# Patient Record
Sex: FEMALE | Race: WHITE | NOT HISPANIC OR LATINO | URBAN - METROPOLITAN AREA
[De-identification: names, ages, dates, MRNs, and addresses within clinical notes are randomized per-mention and may not be internally consistent; named-entity substitution may affect disease eponyms.]

---

## 2018-02-12 ENCOUNTER — EMERGENCY (EMERGENCY)
Facility: HOSPITAL | Age: 38
LOS: 1 days | Discharge: HOME | End: 2018-02-12
Attending: EMERGENCY MEDICINE
Payer: COMMERCIAL

## 2018-02-12 VITALS
OXYGEN SATURATION: 97 % | SYSTOLIC BLOOD PRESSURE: 113 MMHG | TEMPERATURE: 98 F | HEART RATE: 68 BPM | DIASTOLIC BLOOD PRESSURE: 79 MMHG | RESPIRATION RATE: 14 BRPM

## 2018-02-12 VITALS
TEMPERATURE: 98 F | HEART RATE: 90 BPM | SYSTOLIC BLOOD PRESSURE: 134 MMHG | RESPIRATION RATE: 12 BRPM | OXYGEN SATURATION: 98 % | WEIGHT: 250 LBS | DIASTOLIC BLOOD PRESSURE: 92 MMHG

## 2018-02-12 PROBLEM — Z00.00 ENCOUNTER FOR PREVENTIVE HEALTH EXAMINATION: Status: ACTIVE | Noted: 2018-02-12

## 2018-02-12 PROCEDURE — 70450 CT HEAD/BRAIN W/O DYE: CPT | Mod: 26

## 2018-02-12 PROCEDURE — 70450 CT HEAD/BRAIN W/O DYE: CPT

## 2018-02-12 PROCEDURE — 72125 CT NECK SPINE W/O DYE: CPT

## 2018-02-12 PROCEDURE — 99284 EMERGENCY DEPT VISIT MOD MDM: CPT | Mod: 25

## 2018-02-12 PROCEDURE — 99284 EMERGENCY DEPT VISIT MOD MDM: CPT

## 2018-02-12 PROCEDURE — 72125 CT NECK SPINE W/O DYE: CPT | Mod: 26

## 2018-02-12 NOTE — ED ADULT TRIAGE NOTE - CHIEF COMPLAINT QUOTE
patient with complain of dizziness s/p unwitnessed fall on 2/9/2018 sent to ED by PMD, + head trauma, denies LOC, nausea and vomiting, vision changes

## 2018-02-12 NOTE — ED ADULT NURSE NOTE - NSHISCREENINGQ1_ED_A_ED
Verified Results  (1) PT WITH INR [id number] 08:18AM Kofi Cutler     Test Name Result Flag Reference   INR 2 24 H 0 86-1 16   PT 24 5 seconds H 12 0-14 3
No

## 2018-02-12 NOTE — ED PROVIDER NOTE - CHPI ED SYMPTOMS NEG
no nausea/no syncope/no blurred vision/no loss of consciousness/no vomiting/no weakness/no confusion/no change in level of consciousness

## 2018-02-12 NOTE — ED ADULT NURSE NOTE - OBJECTIVE STATEMENT
Pt. fell on Friday and hit her head on part of her friends bathtub. Pt. denies LOC, nausea or mental status changes. Stated that she had a small bump and a tender area below her neck. At work this morning pt. began to feel woozy and came to be evaluated.

## 2018-07-11 ENCOUNTER — APPOINTMENT (OUTPATIENT)
Dept: OBGYN | Facility: CLINIC | Age: 38
End: 2018-07-11

## 2018-08-13 ENCOUNTER — OUTPATIENT (OUTPATIENT)
Dept: OUTPATIENT SERVICES | Facility: HOSPITAL | Age: 38
LOS: 1 days | End: 2018-08-13
Payer: COMMERCIAL

## 2018-08-13 VITALS
SYSTOLIC BLOOD PRESSURE: 131 MMHG | TEMPERATURE: 98 F | OXYGEN SATURATION: 99 % | HEART RATE: 96 BPM | WEIGHT: 255.96 LBS | HEIGHT: 67 IN | RESPIRATION RATE: 20 BRPM | DIASTOLIC BLOOD PRESSURE: 82 MMHG

## 2018-08-13 DIAGNOSIS — D25.9 LEIOMYOMA OF UTERUS, UNSPECIFIED: ICD-10-CM

## 2018-08-13 DIAGNOSIS — L72.9 FOLLICULAR CYST OF THE SKIN AND SUBCUTANEOUS TISSUE, UNSPECIFIED: Chronic | ICD-10-CM

## 2018-08-13 DIAGNOSIS — E66.01 MORBID (SEVERE) OBESITY DUE TO EXCESS CALORIES: ICD-10-CM

## 2018-08-13 DIAGNOSIS — Z01.818 ENCOUNTER FOR OTHER PREPROCEDURAL EXAMINATION: ICD-10-CM

## 2018-08-13 DIAGNOSIS — Z29.9 ENCOUNTER FOR PROPHYLACTIC MEASURES, UNSPECIFIED: ICD-10-CM

## 2018-08-13 LAB
ANION GAP SERPL CALC-SCNC: 13 MMOL/L — SIGNIFICANT CHANGE UP (ref 5–17)
BLD GP AB SCN SERPL QL: NEGATIVE — SIGNIFICANT CHANGE UP
BUN SERPL-MCNC: 16 MG/DL — SIGNIFICANT CHANGE UP (ref 7–23)
CALCIUM SERPL-MCNC: 9.2 MG/DL — SIGNIFICANT CHANGE UP (ref 8.4–10.5)
CHLORIDE SERPL-SCNC: 101 MMOL/L — SIGNIFICANT CHANGE UP (ref 96–108)
CO2 SERPL-SCNC: 23 MMOL/L — SIGNIFICANT CHANGE UP (ref 22–31)
CREAT SERPL-MCNC: 0.6 MG/DL — SIGNIFICANT CHANGE UP (ref 0.5–1.3)
GLUCOSE SERPL-MCNC: 87 MG/DL — SIGNIFICANT CHANGE UP (ref 70–99)
HCT VFR BLD CALC: 35.9 % — SIGNIFICANT CHANGE UP (ref 34.5–45)
HGB BLD-MCNC: 11.1 G/DL — LOW (ref 11.5–15.5)
MCHC RBC-ENTMCNC: 26.4 PG — LOW (ref 27–34)
MCHC RBC-ENTMCNC: 30.9 GM/DL — LOW (ref 32–36)
MCV RBC AUTO: 85.5 FL — SIGNIFICANT CHANGE UP (ref 80–100)
PLATELET # BLD AUTO: 357 K/UL — SIGNIFICANT CHANGE UP (ref 150–400)
POTASSIUM SERPL-MCNC: 4.5 MMOL/L — SIGNIFICANT CHANGE UP (ref 3.5–5.3)
POTASSIUM SERPL-SCNC: 4.5 MMOL/L — SIGNIFICANT CHANGE UP (ref 3.5–5.3)
RBC # BLD: 4.2 M/UL — SIGNIFICANT CHANGE UP (ref 3.8–5.2)
RBC # FLD: 16.2 % — HIGH (ref 10.3–14.5)
RH IG SCN BLD-IMP: POSITIVE — SIGNIFICANT CHANGE UP
SODIUM SERPL-SCNC: 137 MMOL/L — SIGNIFICANT CHANGE UP (ref 135–145)
WBC # BLD: 6.99 K/UL — SIGNIFICANT CHANGE UP (ref 3.8–10.5)
WBC # FLD AUTO: 6.99 K/UL — SIGNIFICANT CHANGE UP (ref 3.8–10.5)

## 2018-08-13 PROCEDURE — 80048 BASIC METABOLIC PNL TOTAL CA: CPT

## 2018-08-13 PROCEDURE — 86850 RBC ANTIBODY SCREEN: CPT

## 2018-08-13 PROCEDURE — 86900 BLOOD TYPING SEROLOGIC ABO: CPT

## 2018-08-13 PROCEDURE — 85027 COMPLETE CBC AUTOMATED: CPT

## 2018-08-13 PROCEDURE — 86901 BLOOD TYPING SEROLOGIC RH(D): CPT

## 2018-08-13 PROCEDURE — G0463: CPT

## 2018-08-13 NOTE — H&P PST ADULT - PMH
ANA (iron deficiency anemia)    Low back pain ANA (iron deficiency anemia)    Low back pain    Morbid obesity with BMI of 40.0-44.9, adult

## 2018-08-13 NOTE — H&P PST ADULT - HISTORY OF PRESENT ILLNESS
37 yo female with large fibroids causing heavy periods, pt now for surgery.  Currently on blood builder to help with anemia.

## 2018-08-13 NOTE — H&P PST ADULT - ATTENDING COMMENTS
pt counseled and consented for xlap myomectomy  preop for or  also see office chart for complete preop counseling  msp

## 2018-08-13 NOTE — H&P PST ADULT - ASSESSMENT

## 2018-08-21 ENCOUNTER — TRANSCRIPTION ENCOUNTER (OUTPATIENT)
Age: 38
End: 2018-08-21

## 2018-08-22 ENCOUNTER — INPATIENT (INPATIENT)
Facility: HOSPITAL | Age: 38
LOS: 1 days | Discharge: HOME | DRG: 742 | End: 2018-08-24
Attending: STUDENT IN AN ORGANIZED HEALTH CARE EDUCATION/TRAINING PROGRAM | Admitting: STUDENT IN AN ORGANIZED HEALTH CARE EDUCATION/TRAINING PROGRAM
Payer: COMMERCIAL

## 2018-08-22 ENCOUNTER — RESULT REVIEW (OUTPATIENT)
Age: 38
End: 2018-08-22

## 2018-08-22 VITALS
OXYGEN SATURATION: 99 % | DIASTOLIC BLOOD PRESSURE: 75 MMHG | RESPIRATION RATE: 18 BRPM | HEIGHT: 67 IN | SYSTOLIC BLOOD PRESSURE: 150 MMHG | HEART RATE: 94 BPM | WEIGHT: 255.07 LBS | TEMPERATURE: 98 F

## 2018-08-22 DIAGNOSIS — D25.9 LEIOMYOMA OF UTERUS, UNSPECIFIED: ICD-10-CM

## 2018-08-22 DIAGNOSIS — Z98.890 OTHER SPECIFIED POSTPROCEDURAL STATES: ICD-10-CM

## 2018-08-22 DIAGNOSIS — L72.9 FOLLICULAR CYST OF THE SKIN AND SUBCUTANEOUS TISSUE, UNSPECIFIED: Chronic | ICD-10-CM

## 2018-08-22 LAB
HCG UR QL: NEGATIVE — SIGNIFICANT CHANGE UP
RH IG SCN BLD-IMP: POSITIVE — SIGNIFICANT CHANGE UP

## 2018-08-22 PROCEDURE — 88305 TISSUE EXAM BY PATHOLOGIST: CPT | Mod: 26

## 2018-08-22 RX ORDER — CHOLECALCIFEROL (VITAMIN D3) 125 MCG
1 CAPSULE ORAL
Qty: 0 | Refills: 0 | COMMUNITY

## 2018-08-22 RX ORDER — HYDROMORPHONE HYDROCHLORIDE 2 MG/ML
0.5 INJECTION INTRAMUSCULAR; INTRAVENOUS; SUBCUTANEOUS
Qty: 0 | Refills: 0 | Status: DISCONTINUED | OUTPATIENT
Start: 2018-08-22 | End: 2018-08-23

## 2018-08-22 RX ORDER — ACETAMINOPHEN 500 MG
975 TABLET ORAL EVERY 6 HOURS
Qty: 0 | Refills: 0 | Status: DISCONTINUED | OUTPATIENT
Start: 2018-08-22 | End: 2018-08-24

## 2018-08-22 RX ORDER — DEXAMETHASONE 0.5 MG/5ML
4 ELIXIR ORAL EVERY 6 HOURS
Qty: 0 | Refills: 0 | Status: DISCONTINUED | OUTPATIENT
Start: 2018-08-22 | End: 2018-08-23

## 2018-08-22 RX ORDER — HEPARIN SODIUM 5000 [USP'U]/ML
5000 INJECTION INTRAVENOUS; SUBCUTANEOUS EVERY 12 HOURS
Qty: 0 | Refills: 0 | Status: DISCONTINUED | OUTPATIENT
Start: 2018-08-23 | End: 2018-08-24

## 2018-08-22 RX ORDER — OXYCODONE HYDROCHLORIDE 5 MG/1
5 TABLET ORAL
Qty: 0 | Refills: 0 | Status: DISCONTINUED | OUTPATIENT
Start: 2018-08-22 | End: 2018-08-24

## 2018-08-22 RX ORDER — NALOXONE HYDROCHLORIDE 4 MG/.1ML
0.1 SPRAY NASAL
Qty: 0 | Refills: 0 | Status: DISCONTINUED | OUTPATIENT
Start: 2018-08-22 | End: 2018-08-23

## 2018-08-22 RX ORDER — SODIUM CHLORIDE 9 MG/ML
1000 INJECTION, SOLUTION INTRAVENOUS
Qty: 0 | Refills: 0 | Status: DISCONTINUED | OUTPATIENT
Start: 2018-08-22 | End: 2018-08-23

## 2018-08-22 RX ORDER — ACETAMINOPHEN 500 MG
1000 TABLET ORAL ONCE
Qty: 0 | Refills: 0 | Status: COMPLETED | OUTPATIENT
Start: 2018-08-22 | End: 2018-08-22

## 2018-08-22 RX ORDER — SODIUM CHLORIDE 9 MG/ML
3 INJECTION INTRAMUSCULAR; INTRAVENOUS; SUBCUTANEOUS EVERY 8 HOURS
Qty: 0 | Refills: 0 | Status: DISCONTINUED | OUTPATIENT
Start: 2018-08-22 | End: 2018-08-22

## 2018-08-22 RX ORDER — PROCHLORPERAZINE MALEATE 5 MG
10 TABLET ORAL EVERY 6 HOURS
Qty: 0 | Refills: 0 | Status: DISCONTINUED | OUTPATIENT
Start: 2018-08-22 | End: 2018-08-24

## 2018-08-22 RX ORDER — CELECOXIB 200 MG/1
200 CAPSULE ORAL ONCE
Qty: 0 | Refills: 0 | Status: COMPLETED | OUTPATIENT
Start: 2018-08-22 | End: 2018-08-22

## 2018-08-22 RX ORDER — GENTAMICIN SULFATE 40 MG/ML
350 VIAL (ML) INJECTION ONCE
Qty: 0 | Refills: 0 | Status: DISCONTINUED | OUTPATIENT
Start: 2018-08-22 | End: 2018-08-22

## 2018-08-22 RX ORDER — ACETAMINOPHEN 500 MG
975 TABLET ORAL ONCE
Qty: 0 | Refills: 0 | Status: COMPLETED | OUTPATIENT
Start: 2018-08-22 | End: 2018-08-22

## 2018-08-22 RX ORDER — LIDOCAINE HCL 20 MG/ML
0.2 VIAL (ML) INJECTION ONCE
Qty: 0 | Refills: 0 | Status: DISCONTINUED | OUTPATIENT
Start: 2018-08-22 | End: 2018-08-22

## 2018-08-22 RX ORDER — ONDANSETRON 8 MG/1
4 TABLET, FILM COATED ORAL EVERY 6 HOURS
Qty: 0 | Refills: 0 | Status: DISCONTINUED | OUTPATIENT
Start: 2018-08-22 | End: 2018-08-24

## 2018-08-22 RX ORDER — IBUPROFEN 200 MG
600 TABLET ORAL EVERY 6 HOURS
Qty: 0 | Refills: 0 | Status: DISCONTINUED | OUTPATIENT
Start: 2018-08-22 | End: 2018-08-24

## 2018-08-22 RX ORDER — FAMOTIDINE 10 MG/ML
20 INJECTION INTRAVENOUS ONCE
Qty: 0 | Refills: 0 | Status: COMPLETED | OUTPATIENT
Start: 2018-08-22 | End: 2018-08-22

## 2018-08-22 RX ORDER — HYDROMORPHONE HYDROCHLORIDE 2 MG/ML
30 INJECTION INTRAMUSCULAR; INTRAVENOUS; SUBCUTANEOUS
Qty: 0 | Refills: 0 | Status: DISCONTINUED | OUTPATIENT
Start: 2018-08-22 | End: 2018-08-23

## 2018-08-22 RX ADMIN — CELECOXIB 200 MILLIGRAM(S): 200 CAPSULE ORAL at 13:31

## 2018-08-22 RX ADMIN — FAMOTIDINE 20 MILLIGRAM(S): 10 INJECTION INTRAVENOUS at 13:31

## 2018-08-22 RX ADMIN — HYDROMORPHONE HYDROCHLORIDE 30 MILLILITER(S): 2 INJECTION INTRAMUSCULAR; INTRAVENOUS; SUBCUTANEOUS at 23:10

## 2018-08-22 RX ADMIN — SODIUM CHLORIDE 125 MILLILITER(S): 9 INJECTION, SOLUTION INTRAVENOUS at 20:32

## 2018-08-22 RX ADMIN — Medication 1000 MILLIGRAM(S): at 22:30

## 2018-08-22 RX ADMIN — Medication 975 MILLIGRAM(S): at 13:31

## 2018-08-22 RX ADMIN — Medication 400 MILLIGRAM(S): at 22:02

## 2018-08-22 RX ADMIN — HYDROMORPHONE HYDROCHLORIDE 0.5 MILLIGRAM(S): 2 INJECTION INTRAMUSCULAR; INTRAVENOUS; SUBCUTANEOUS at 20:45

## 2018-08-22 RX ADMIN — HYDROMORPHONE HYDROCHLORIDE 30 MILLILITER(S): 2 INJECTION INTRAMUSCULAR; INTRAVENOUS; SUBCUTANEOUS at 20:36

## 2018-08-22 RX ADMIN — HYDROMORPHONE HYDROCHLORIDE 0.5 MILLIGRAM(S): 2 INJECTION INTRAMUSCULAR; INTRAVENOUS; SUBCUTANEOUS at 20:28

## 2018-08-22 RX ADMIN — HYDROMORPHONE HYDROCHLORIDE 30 MILLILITER(S): 2 INJECTION INTRAMUSCULAR; INTRAVENOUS; SUBCUTANEOUS at 23:52

## 2018-08-22 NOTE — BRIEF OPERATIVE NOTE - OPERATION/FINDINGS
myomatous uterus, dense adhesions noted in cul-de-sac of posterior uterus to bowel, anterior fundus adhered to anterior abdominal wall

## 2018-08-22 NOTE — BRIEF OPERATIVE NOTE - PROCEDURE
<<-----Click on this checkbox to enter Procedure Abdominal myomectomy  08/22/2018    Active  ANTONINA

## 2018-08-22 NOTE — PROGRESS NOTE ADULT - ASSESSMENT
39yo F now POD0 s/p abdominal myomectomy.  Patient is stable and progressing normally postoperatively.

## 2018-08-23 DIAGNOSIS — D25.9 LEIOMYOMA OF UTERUS, UNSPECIFIED: ICD-10-CM

## 2018-08-23 LAB
HCT VFR BLD CALC: 32.3 % — LOW (ref 34.5–45)
HGB BLD-MCNC: 10.4 G/DL — LOW (ref 11.5–15.5)
MCHC RBC-ENTMCNC: 26.4 PG — LOW (ref 27–34)
MCHC RBC-ENTMCNC: 32.4 GM/DL — SIGNIFICANT CHANGE UP (ref 32–36)
MCV RBC AUTO: 81.7 FL — SIGNIFICANT CHANGE UP (ref 80–100)
PLATELET # BLD AUTO: 267 K/UL — SIGNIFICANT CHANGE UP (ref 150–400)
RBC # BLD: 3.95 M/UL — SIGNIFICANT CHANGE UP (ref 3.8–5.2)
RBC # FLD: 14.6 % — HIGH (ref 10.3–14.5)
WBC # BLD: 13.4 K/UL — HIGH (ref 3.8–10.5)
WBC # FLD AUTO: 13.4 K/UL — HIGH (ref 3.8–10.5)

## 2018-08-23 RX ADMIN — Medication 600 MILLIGRAM(S): at 12:45

## 2018-08-23 RX ADMIN — HEPARIN SODIUM 5000 UNIT(S): 5000 INJECTION INTRAVENOUS; SUBCUTANEOUS at 06:35

## 2018-08-23 RX ADMIN — Medication 975 MILLIGRAM(S): at 20:40

## 2018-08-23 RX ADMIN — Medication 600 MILLIGRAM(S): at 18:01

## 2018-08-23 RX ADMIN — OXYCODONE HYDROCHLORIDE 5 MILLIGRAM(S): 5 TABLET ORAL at 20:40

## 2018-08-23 RX ADMIN — Medication 975 MILLIGRAM(S): at 20:10

## 2018-08-23 RX ADMIN — Medication 975 MILLIGRAM(S): at 15:17

## 2018-08-23 RX ADMIN — HEPARIN SODIUM 5000 UNIT(S): 5000 INJECTION INTRAVENOUS; SUBCUTANEOUS at 17:59

## 2018-08-23 RX ADMIN — SODIUM CHLORIDE 125 MILLILITER(S): 9 INJECTION, SOLUTION INTRAVENOUS at 12:47

## 2018-08-23 RX ADMIN — OXYCODONE HYDROCHLORIDE 5 MILLIGRAM(S): 5 TABLET ORAL at 20:10

## 2018-08-23 RX ADMIN — HYDROMORPHONE HYDROCHLORIDE 30 MILLILITER(S): 2 INJECTION INTRAMUSCULAR; INTRAVENOUS; SUBCUTANEOUS at 07:36

## 2018-08-23 NOTE — PROGRESS NOTE ADULT - ASSESSMENT
37yo POD#1 s/p Abdominal Myomectomy . Currently stable.    Neuro: PCA for pain control. Will transition to po pain meds once tolerating PO.  CV: Hemodynamically stable. Follow up AM CBC.  Pulm: Saturating well on room air, encourage incentive spirometry  GI: Clear diet  : UOP adequate, glover removed this am. Due to void.  Heme: c/w HSQ and SCDs for DVT ppx  Dispo: Continue routine post-op care    TONY Crowder, PGY3  #[id number] 37yo POD#1 s/p Abdominal Myomectomy . Currently stable.

## 2018-08-24 ENCOUNTER — TRANSCRIPTION ENCOUNTER (OUTPATIENT)
Age: 38
End: 2018-08-24

## 2018-08-24 VITALS
RESPIRATION RATE: 18 BRPM | TEMPERATURE: 98 F | DIASTOLIC BLOOD PRESSURE: 75 MMHG | SYSTOLIC BLOOD PRESSURE: 105 MMHG | OXYGEN SATURATION: 99 % | HEART RATE: 86 BPM

## 2018-08-24 LAB
HCT VFR BLD CALC: 29.6 % — LOW (ref 34.5–45)
HGB BLD-MCNC: 8.9 G/DL — LOW (ref 11.5–15.5)
MCHC RBC-ENTMCNC: 25 PG — LOW (ref 27–34)
MCHC RBC-ENTMCNC: 30.2 GM/DL — LOW (ref 32–36)
MCV RBC AUTO: 82.9 FL — SIGNIFICANT CHANGE UP (ref 80–100)
PLATELET # BLD AUTO: 239 K/UL — SIGNIFICANT CHANGE UP (ref 150–400)
RBC # BLD: 3.57 M/UL — LOW (ref 3.8–5.2)
RBC # FLD: 14.7 % — HIGH (ref 10.3–14.5)
WBC # BLD: 7.5 K/UL — SIGNIFICANT CHANGE UP (ref 3.8–10.5)
WBC # FLD AUTO: 7.5 K/UL — SIGNIFICANT CHANGE UP (ref 3.8–10.5)

## 2018-08-24 PROCEDURE — C1889: CPT

## 2018-08-24 PROCEDURE — 88305 TISSUE EXAM BY PATHOLOGIST: CPT

## 2018-08-24 PROCEDURE — 81025 URINE PREGNANCY TEST: CPT

## 2018-08-24 PROCEDURE — 86901 BLOOD TYPING SEROLOGIC RH(D): CPT

## 2018-08-24 PROCEDURE — 85027 COMPLETE CBC AUTOMATED: CPT

## 2018-08-24 PROCEDURE — 86900 BLOOD TYPING SEROLOGIC ABO: CPT

## 2018-08-24 RX ORDER — IBUPROFEN 200 MG
1 TABLET ORAL
Qty: 0 | Refills: 0 | DISCHARGE
Start: 2018-08-24

## 2018-08-24 RX ORDER — ACETAMINOPHEN 500 MG
3 TABLET ORAL
Qty: 0 | Refills: 0 | DISCHARGE
Start: 2018-08-24

## 2018-08-24 RX ORDER — OXYCODONE HYDROCHLORIDE 5 MG/1
1 TABLET ORAL
Qty: 20 | Refills: 0
Start: 2018-08-24 | End: 2018-08-28

## 2018-08-24 RX ADMIN — Medication 600 MILLIGRAM(S): at 00:26

## 2018-08-24 RX ADMIN — Medication 600 MILLIGRAM(S): at 05:44

## 2018-08-24 RX ADMIN — Medication 975 MILLIGRAM(S): at 06:15

## 2018-08-24 RX ADMIN — Medication 600 MILLIGRAM(S): at 01:00

## 2018-08-24 RX ADMIN — HEPARIN SODIUM 5000 UNIT(S): 5000 INJECTION INTRAVENOUS; SUBCUTANEOUS at 05:44

## 2018-08-24 RX ADMIN — Medication 975 MILLIGRAM(S): at 13:00

## 2018-08-24 RX ADMIN — Medication 975 MILLIGRAM(S): at 05:44

## 2018-08-24 RX ADMIN — OXYCODONE HYDROCHLORIDE 5 MILLIGRAM(S): 5 TABLET ORAL at 06:15

## 2018-08-24 RX ADMIN — OXYCODONE HYDROCHLORIDE 5 MILLIGRAM(S): 5 TABLET ORAL at 05:44

## 2018-08-24 RX ADMIN — Medication 975 MILLIGRAM(S): at 12:06

## 2018-08-24 RX ADMIN — Medication 600 MILLIGRAM(S): at 06:15

## 2018-08-24 NOTE — DISCHARGE NOTE ADULT - MEDICATION SUMMARY - MEDICATIONS TO TAKE
I will START or STAY ON the medications listed below when I get home from the hospital:    megaboost blood builder  -- once a day  -- Indication: For home med    puradyme  -- 1  by mouth once a day  -- Indication: For home med    ibuprofen 600 mg oral tablet  -- 1 tab(s) by mouth every 6 hours as needed for moderate pain  -- Indication: For Pain    acetaminophen 325 mg oral tablet  -- 3 tab(s) by mouth every 6 hours as needed for mild pain  -- Indication: For Pain    oxyCODONE 5 mg oral tablet  -- 1 tab(s) by mouth every 6 hours MDD:4 tabs  -- Caution federal law prohibits the transfer of this drug to any person other  than the person for whom it was prescribed.  It is very important that you take or use this exactly as directed.  Do not skip doses or discontinue unless directed by your doctor.  May cause drowsiness.  Alcohol may intensify this effect.  Use care when operating dangerous machinery.  This prescription cannot be refilled.  Using more of this medication than prescribed may cause serious breathing problems.    -- Indication: For Pain    Multiple Vitamins oral capsule  -- 1 cap(s) by mouth once a day  -- Indication: For home med    Vitamin D3 2000 intl units oral tablet  -- 2 tab(s) by mouth once a day  -- Indication: For home med    Vitamin C 500 mg oral tablet  -- 1 tab(s) by mouth once a day  -- Indication: For home med

## 2018-08-24 NOTE — DISCHARGE NOTE ADULT - CARE PROVIDER_API CALL
Radha Meyer), Obstetrics and Gynecology  [address] [place] [address] [address]  [place] [address]  Phone: [phone]  Fax: [phone]

## 2018-08-24 NOTE — DISCHARGE NOTE ADULT - CARE PLAN
Principal Discharge DX:	Post-operative state  Goal:	Recovery  Assessment and plan of treatment:	nothing in vagina (sex, tampons, douching) no heavy lifting (>10 lbs) for 6 weeks. Please return to ER or call your doctors office if pain is worsening, you are unable to keep down food or drink, fever >101, heavy vaginal bleeding. You are able to take a shower regularly.

## 2018-08-24 NOTE — PROGRESS NOTE ADULT - ASSESSMENT
39yo POD#2 s/p abdominal myomectomy . Patient stable and progressing appropriately in postoperative period.

## 2018-08-24 NOTE — PROGRESS NOTE ADULT - PROBLEM SELECTOR PLAN 1
NEURO: PCA, IV tylenol, motrin, oxy for pain; zofran prn nausea  CV: q4h VS, AM CBC  PULM: increase incentive spirometry  GI: advance diet as tolerated; colace/mylicon prn  : continue glover  HEME: SCDs, early ambulation, HSQ  ID: afebrile    PATRICIA Daley MD PGY2
Neuro: c/w po pain meds  CV: Hemodynamically stable  Pulm: Saturating well on room air  GI: c/w regular diet  : voiding spontaneously  Heme: c/w HSQ and SCDs for DVT ppx, OOB  Dispo: Continue routine post-op care    Araceli Quinteros, PGY-1  Pager# [phone]
Neuro: PCA for pain control. Will transition to po pain meds once tolerating PO.  CV: Hemodynamically stable. Follow up AM CBC.  Pulm: Saturating well on room air, encourage incentive spirometry  GI: Clear diet  : UOP adequate, glover removed this am. Due to void.  Heme: c/w HSQ and SCDs for DVT ppx  Dispo: Continue routine post-op care    TONY Crowder, PGY3  #[phone]

## 2018-08-24 NOTE — DISCHARGE NOTE ADULT - PLAN OF CARE
Recovery nothing in vagina (sex, tampons, douching) no heavy lifting (>10 lbs) for 6 weeks. Please return to ER or call your doctors office if pain is worsening, you are unable to keep down food or drink, fever >101, heavy vaginal bleeding. You are able to take a shower regularly.

## 2018-08-24 NOTE — PROGRESS NOTE ADULT - SUBJECTIVE AND OBJECTIVE BOX
Day __ of Anesthesia Pain Management Service    SUBJECTIVE: Patient is doing well with IV PCA    Pain Scale Score:	[X] Refer to charted pain scores    THERAPY:    [ ] IV PCA Morphine		[ ] 5 mg/mL	[ ] 1 mg/mL  [X] IV PCA Hydromorphone	[ ] 5 mg/mL	[X] 1 mg/mL  [ ] IV PCA Fentanyl		[ ] 50 micrograms/mL    Demand dose: 0.2 mg     Lockout: 6 minutes   Continuous Rate: 0 mg/hr  4 Hour Limit: 4 mg    MEDICATIONS  (STANDING):  acetaminophen   Tablet. 975 milliGRAM(s) Oral every 6 hours  heparin  Injectable 5000 Unit(s) SubCutaneous every 12 hours  HYDROmorphone PCA (1 mG/mL) 30 milliLiter(s) PCA Continuous PCA Continuous  ibuprofen  Tablet 600 milliGRAM(s) Oral every 6 hours  lactated ringers. 1000 milliLiter(s) (125 mL/Hr) IV Continuous <Continuous>    MEDICATIONS  (PRN):  dexamethasone  Injectable 4 milliGRAM(s) IV Push every 6 hours PRN Nausea, IF ondansetron is ineffective after 30 - 60 minute  HYDROmorphone PCA (1 mG/mL) Rescue Clinician Bolus 0.5 milliGRAM(s) IV Push every 15 minutes PRN for Pain Scale GREATER THAN 6  naloxone Injectable 0.1 milliGRAM(s) IV Push every 3 minutes PRN For ANY of the following changes in patient status:  A. RR LESS THAN 10 breaths per minute, B. Oxygen saturation LESS THAN 90%, C. Sedation score of 6  ondansetron Injectable 4 milliGRAM(s) IV Push every 6 hours PRN Nausea  oxyCODONE    IR 5 milliGRAM(s) Oral every 3 hours PRN Severe Pain (7 - 10)  prochlorperazine   IVPB 10 milliGRAM(s) IV Intermittent every 6 hours PRN Nausea      OBJECTIVE:    Sedation Score:	[ X] Alert	[ ] Drowsy 	[ ] Arousable	[ ] Asleep	[ ] Unresponsive    Side Effects:	[X ] None	[ ] Nausea	[ ] Vomiting	[ ] Pruritus  		[ ] Other:    Vital Signs Last 24 Hrs  T(C): 37.2 (23 Aug 2018 09:17), Max: 37.2 (23 Aug 2018 09:17)  T(F): 98.9 (23 Aug 2018 09:17), Max: 98.9 (23 Aug 2018 09:17)  HR: 92 (23 Aug 2018 09:17) (84 - 100)  BP: 132/72 (23 Aug 2018 09:17) (103/57 - 150/75)  BP(mean): 74 (22 Aug 2018 22:30) (74 - 82)  RR: 16 (23 Aug 2018 09:17) (16 - 19)  SpO2: 96% (23 Aug 2018 09:17) (95% - 100%)    ASSESSMENT/ PLAN    Therapy to  be:               [ ] Continued   [X ] Discontinued   [ ] Changed to PRN Analgesics    Documentation and Verification of current medications:   [X] Done	[ ] Not done, not eligible    Comments:    to be d/c'd by service and changed to PO
POD# 2  HD# 3    Patient seen and examined at bedside, no acute overnight events. No acute complaints, pain well controlled. Patient is ambulating, voiding spontaneously, and tolerating regular diet. She is not yet passing flatus. Denies CP, SOB, N/V, fevers, and chills.    Vital Signs Last 24 Hours  T(C): 37.1 (08-24-18 @ 05:39), Max: 37.3 (08-23-18 @ 14:01)  HR: 92 (08-24-18 @ 05:39) (80 - 94)  BP: 106/75 (08-24-18 @ 05:39) (94/65 - 132/72)  RR: 17 (08-24-18 @ 05:39) (16 - 18)  SpO2: 96% (08-24-18 @ 05:39) (95% - 97%)    I&O's Summary    22 Aug 2018 07:01  -  23 Aug 2018 07:00  --------------------------------------------------------  IN: 1675 mL / OUT: 1325 mL / NET: 350 mL    23 Aug 2018 07:01  -  24 Aug 2018 06:36  --------------------------------------------------------  IN: 2420 mL / OUT: 1025 mL / NET: 1395 mL    Physical Exam:  General: NAD  CV: NR, RR, S1, S2, no M/R/G  Lungs: CTAB  Abdomen: Soft, non-tender, non-distended  Incision: Pfannenstiel CDI  Ext: No pain or swelling    Labs:                        10.4   13.4  )-----------( 267      ( 23 Aug 2018 06:53 )             32.3   baso x      eos x      imm gran x      lymph x      mono x      poly x          MEDICATIONS  (STANDING):  acetaminophen   Tablet. 975 milliGRAM(s) Oral every 6 hours  heparin  Injectable 5000 Unit(s) SubCutaneous every 12 hours  ibuprofen  Tablet 600 milliGRAM(s) Oral every 6 hours    MEDICATIONS  (PRN):  ondansetron Injectable 4 milliGRAM(s) IV Push every 6 hours PRN Nausea  oxyCODONE    IR 5 milliGRAM(s) Oral every 3 hours PRN Severe Pain (7 - 10)  prochlorperazine   IVPB 10 milliGRAM(s) IV Intermittent every 6 hours PRN Nausea
POD#1   HD#2    Patient seen and examined at bedside, no acute overnight events. No acute complaints, pain well controlled.  Patient is not OOB and tolerating clears. Has not yet passed flatus. Ibanez removed this AM. Denies CP, SOB, N/V, fevers, and chills.    Vital Signs Last 24 Hours  T(C): 36.9 (08-23-18 @ 06:14), Max: 37 (08-22-18 @ 23:00)  HR: 100 (08-23-18 @ 06:14) (84 - 100)  BP: 117/74 (08-23-18 @ 06:14) (103/57 - 150/75)  RR: 18 (08-23-18 @ 06:14) (16 - 19)  SpO2: 95% (08-23-18 @ 06:14) (95% - 100%)    I&O's Summary    22 Aug 2018 07:01  -  23 Aug 2018 06:49  --------------------------------------------------------  IN: 1675 mL / OUT: 1325 mL / NET: 350 mL        Physical Exam:  General: NAD  CV: NR, RR, S1, S2, no M/R/G  Lungs: CTA-B  Abdomen: Soft, non-tender, non-distended, +BS  Incision: Pfannesteil CDI  Ext: No pain or swelling    Labs:  Awaiting AM labs    MEDICATIONS  (STANDING):  acetaminophen   Tablet. 975 milliGRAM(s) Oral every 6 hours  heparin  Injectable 5000 Unit(s) SubCutaneous every 12 hours  HYDROmorphone PCA (1 mG/mL) 30 milliLiter(s) PCA Continuous PCA Continuous  ibuprofen  Tablet 600 milliGRAM(s) Oral every 6 hours  lactated ringers. 1000 milliLiter(s) (125 mL/Hr) IV Continuous <Continuous>    MEDICATIONS  (PRN):  dexamethasone  Injectable 4 milliGRAM(s) IV Push every 6 hours PRN Nausea, IF ondansetron is ineffective after 30 - 60 minute  HYDROmorphone PCA (1 mG/mL) Rescue Clinician Bolus 0.5 milliGRAM(s) IV Push every 15 minutes PRN for Pain Scale GREATER THAN 6  naloxone Injectable 0.1 milliGRAM(s) IV Push every 3 minutes PRN For ANY of the following changes in patient status:  A. RR LESS THAN 10 breaths per minute, B. Oxygen saturation LESS THAN 90%, C. Sedation score of 6  ondansetron Injectable 4 milliGRAM(s) IV Push every 6 hours PRN Nausea  oxyCODONE    IR 5 milliGRAM(s) Oral every 3 hours PRN Severe Pain (7 - 10)  prochlorperazine   IVPB 10 milliGRAM(s) IV Intermittent every 6 hours PRN Nausea
R2 GYN POST-OP CHECK NOTE    SUBJECTIVE:    Pt seen and evaluated at bedside.  Pt reports pain is poorly controlled w/ PCA pump.  She is not yet out of bed.  Ibanez catheter in place.  She has not yet taken po.  She denies fever, chills, nausea, vomiting, headache, dizzyness, chest pain, palpitations, shortness of breath.    acetaminophen   Tablet. 975 milliGRAM(s) Oral every 6 hours  dexamethasone  Injectable 4 milliGRAM(s) IV Push every 6 hours PRN  HYDROmorphone  Injectable 0.5 milliGRAM(s) IV Push every 10 minutes PRN  HYDROmorphone PCA (1 mG/mL) 30 milliLiter(s) PCA Continuous PCA Continuous  HYDROmorphone PCA (1 mG/mL) Rescue Clinician Bolus 0.5 milliGRAM(s) IV Push every 15 minutes PRN  ibuprofen  Tablet 600 milliGRAM(s) Oral every 6 hours  lactated ringers. 1000 milliLiter(s) IV Continuous <Continuous>  naloxone Injectable 0.1 milliGRAM(s) IV Push every 3 minutes PRN  ondansetron Injectable 4 milliGRAM(s) IV Push every 6 hours PRN  oxyCODONE    IR 5 milliGRAM(s) Oral every 3 hours PRN  prochlorperazine   IVPB 10 milliGRAM(s) IV Intermittent every 6 hours PRN    OBJECTIVE:    VITAL SIGNS:  T(C): 36.5 (22 Aug 2018 21:15), Max: 36.8 (22 Aug 2018 13:14)  T(F): 97.7 (22 Aug 2018 21:15), Max: 98.2 (22 Aug 2018 13:14)  HR: 92 (22 Aug 2018 21:45) (87 - 99)  BP: 103/57 (22 Aug 2018 21:45) (103/57 - 150/75)  BP(mean): 76 (22 Aug 2018 21:45) (74 - 82)  RR: 18 (22 Aug 2018 21:45) (16 - 18)  SpO2: 97% (22 Aug 2018 21:45) (95% - 100%)    I/Os:  08-22-18 @ 07:01  -  08-22-18 @ 21:51  --------------------------------------------------------  IN: 125 mL / OUT: 50 mL / NET: 75 mL    PHYSICAL EXAM:  GEN: NAD, A&Ox3  CV: RRR, no m/g/r  LUNGS: CTAB  ABD: soft, appropriately tender, ND, +BS  Incision: pfannensteil w/ dermabond c/d/i  EXT: WWP, no edema/TTP
back note 645pm    pt feeling well  +oob, tolerating reg diet, thou still no flatus    vss/af  abd soft, mildly distended, appropriate tender  inc c/d/i    c/w oral pain control  c/w reg diet  encouraged on increased ambulation  reviewed surgery in detail along with findings of endometriosis and 14 fibriods removed via one uterine incision without incident  pt counseled x 20 min all questions answered  pt desires d/c home tmr afternoon on pod2   reassured  msp

## 2018-08-24 NOTE — DISCHARGE NOTE ADULT - HOSPITAL COURSE
39y/o s/p abdominal myomectomy  2/2 uterine fibroids    EBL: 200   HCT 35.9->32.3->____   .  See operative note for details of procedure.  POD#0, patient was advanced to a regular diet.  POD#1, glover was discontinued and patient voided spontaneously.  Patient was discharged on POD#2 in stable condition with adequate pain control, ambulating, tolerating PO and voiding spontaneously.  Patient to follow up with Dr. Burton in 2 weeks. 39y/o s/p abdominal myomectomy  2/2 uterine fibroids    EBL: 200   HCT 35.9->32.3->29.6  .  See operative note for details of procedure.  POD#0, patient was advanced to a regular diet.  POD#1, glover was discontinued and patient voided spontaneously.  Patient was discharged on POD#2 in stable condition with adequate pain control, ambulating, tolerating PO and voiding spontaneously.  Patient to follow up with Dr. Burton in 2 weeks.

## 2018-08-24 NOTE — DISCHARGE NOTE ADULT - PATIENT PORTAL LINK FT
You can access the [facility] Patient Portal, offered by [facility], by registering with the following website: [email or web address]

## 2018-12-21 ENCOUNTER — APPOINTMENT (OUTPATIENT)
Dept: CARDIOLOGY | Facility: CLINIC | Age: 38
End: 2018-12-21
Payer: COMMERCIAL

## 2018-12-21 PROBLEM — D50.9 IRON DEFICIENCY ANEMIA, UNSPECIFIED: Chronic | Status: ACTIVE | Noted: 2018-08-13

## 2018-12-21 PROBLEM — M54.5 LOW BACK PAIN: Chronic | Status: ACTIVE | Noted: 2018-08-13

## 2018-12-21 PROCEDURE — 93224 XTRNL ECG REC UP TO 48 HRS: CPT

## 2019-05-06 NOTE — H&P PST ADULT - NS SC CAGE ALCOHOL ANNOYED YOU
After Visit Summary printed and given to patient. Return for Sleep problem, 1 week after sleep study is completed.. Patient instructions provided and reviewed. Patient verbalized understanding, encouraged to call with any questions/concerns.   no

## 2019-09-17 ENCOUNTER — APPOINTMENT (OUTPATIENT)
Dept: OBGYN | Facility: CLINIC | Age: 39
End: 2019-09-17

## 2019-10-27 NOTE — PROGRESS NOTE ADULT - ATTENDING COMMENTS
HPI   Patient is 4 days status post urology urologic surgery. He had a penile implant removed. He said since that time he has been having difficulty getting his urinary stream started and it only comes out and dribbles. He says over the past 12 hours he has not been able to get any urine to pass at all. He presents today stating he feels his bladder is full but he cannot empty it. No other complaints at this time. Past Medical History:   Diagnosis Date    Abnormal glucose level     BPH (benign prostatic hyperplasia)     Erectile dysfunction     HTN (hypertension)     OAB (overactive bladder)     Polyuria        History reviewed. No pertinent surgical history. History reviewed. No pertinent family history.     Social History     Socioeconomic History    Marital status: SINGLE     Spouse name: Not on file    Number of children: Not on file    Years of education: Not on file    Highest education level: Not on file   Occupational History    Not on file   Social Needs    Financial resource strain: Not on file    Food insecurity:     Worry: Not on file     Inability: Not on file    Transportation needs:     Medical: Not on file     Non-medical: Not on file   Tobacco Use    Smoking status: Never Smoker    Smokeless tobacco: Never Used   Substance and Sexual Activity    Alcohol use: No     Frequency: Never    Drug use: No    Sexual activity: Not on file   Lifestyle    Physical activity:     Days per week: Not on file     Minutes per session: Not on file    Stress: Not on file   Relationships    Social connections:     Talks on phone: Not on file     Gets together: Not on file     Attends [employer] service: Not on file     Active member of club or organization: Not on file     Attends meetings of clubs or organizations: Not on file     Relationship status: Not on file    Intimate partner violence:     Fear of current or ex partner: Not on file     Emotionally abused: Not on file Physically abused: Not on file     Forced sexual activity: Not on file   Other Topics Concern    Not on file   Social History Narrative    Not on file         ALLERGIES: Patient has no known allergies. Review of Systems   Constitutional: Negative. HENT: Negative. Respiratory: Negative. Cardiovascular: Negative. Gastrointestinal: Positive for abdominal pain. Genitourinary: Positive for difficulty urinating. Musculoskeletal: Negative. Neurological: Negative. Psychiatric/Behavioral: Negative. Vitals:    10/27/19 1104   BP: (!) 140/99   Pulse: (!) 109   Resp: 18   Temp: 98 °F (36.7 °C)   SpO2: 95%   Weight: 90.7 kg (200 lb)   Height: 5' 7\" (1.702 m)            Physical Exam   Constitutional: He is oriented to person, place, and time. He appears well-developed and well-nourished. HENT:   Head: Normocephalic and atraumatic. Mouth/Throat: Oropharynx is clear and moist.   Eyes: Pupils are equal, round, and reactive to light. EOM are normal.   Neck: Neck supple. Cardiovascular: Normal rate and regular rhythm. Pulmonary/Chest: Effort normal and breath sounds normal.   Abdominal: Soft. There is tenderness. There is suprapubic tenderness and fullness noted on exam.  No peritoneal signs   Musculoskeletal: Normal range of motion. Neurological: He is alert and oriented to person, place, and time. Skin: Skin is warm and dry. Psychiatric: He has a normal mood and affect. Nursing note and vitals reviewed.        MDM       Procedures Patient seen and evaluated  Agree with above note  Continue present management  MD Taiwo

## 2021-07-14 ENCOUNTER — RESULT REVIEW (OUTPATIENT)
Age: 41
End: 2021-07-14

## 2021-11-05 ENCOUNTER — NON-APPOINTMENT (OUTPATIENT)
Age: 41
End: 2021-11-05

## 2021-11-08 ENCOUNTER — ASOB RESULT (OUTPATIENT)
Age: 41
End: 2021-11-08

## 2021-11-08 ENCOUNTER — APPOINTMENT (OUTPATIENT)
Dept: ANTEPARTUM | Facility: CLINIC | Age: 41
End: 2021-11-08
Payer: COMMERCIAL

## 2021-11-08 PROCEDURE — 76801 OB US < 14 WKS SINGLE FETUS: CPT

## 2021-11-08 PROCEDURE — 36416 COLLJ CAPILLARY BLOOD SPEC: CPT

## 2021-11-08 PROCEDURE — 76817 TRANSVAGINAL US OBSTETRIC: CPT

## 2021-11-17 ENCOUNTER — APPOINTMENT (OUTPATIENT)
Dept: ANTEPARTUM | Facility: CLINIC | Age: 41
End: 2021-11-17
Payer: COMMERCIAL

## 2021-11-17 ENCOUNTER — ASOB RESULT (OUTPATIENT)
Age: 41
End: 2021-11-17

## 2021-11-17 PROCEDURE — 36416 COLLJ CAPILLARY BLOOD SPEC: CPT

## 2021-11-17 PROCEDURE — 76813 OB US NUCHAL MEAS 1 GEST: CPT | Mod: 59

## 2021-11-17 PROCEDURE — 76817 TRANSVAGINAL US OBSTETRIC: CPT

## 2021-11-17 PROCEDURE — 76801 OB US < 14 WKS SINGLE FETUS: CPT

## 2021-11-20 ENCOUNTER — TRANSCRIPTION ENCOUNTER (OUTPATIENT)
Age: 41
End: 2021-11-20

## 2021-11-20 ENCOUNTER — OUTPATIENT (OUTPATIENT)
Dept: OUTPATIENT SERVICES | Facility: HOSPITAL | Age: 41
LOS: 1 days | End: 2021-11-20

## 2021-11-20 DIAGNOSIS — Z00.8 ENCOUNTER FOR OTHER GENERAL EXAMINATION: ICD-10-CM

## 2021-11-20 DIAGNOSIS — L72.9 FOLLICULAR CYST OF THE SKIN AND SUBCUTANEOUS TISSUE, UNSPECIFIED: Chronic | ICD-10-CM

## 2021-11-27 ENCOUNTER — APPOINTMENT (OUTPATIENT)
Dept: ULTRASOUND IMAGING | Facility: CLINIC | Age: 41
End: 2021-11-27

## 2021-11-27 ENCOUNTER — OUTPATIENT (OUTPATIENT)
Dept: OUTPATIENT SERVICES | Facility: HOSPITAL | Age: 41
LOS: 1 days | End: 2021-11-27

## 2021-11-27 DIAGNOSIS — Z00.8 ENCOUNTER FOR OTHER GENERAL EXAMINATION: ICD-10-CM

## 2021-11-27 DIAGNOSIS — Z3A.13 13 WEEKS GESTATION OF PREGNANCY: ICD-10-CM

## 2021-11-27 DIAGNOSIS — L72.9 FOLLICULAR CYST OF THE SKIN AND SUBCUTANEOUS TISSUE, UNSPECIFIED: Chronic | ICD-10-CM

## 2021-12-01 DIAGNOSIS — N83.209 UNSPECIFIED OVARIAN CYST, UNSPECIFIED SIDE: ICD-10-CM

## 2021-12-01 DIAGNOSIS — Z34.90 ENCOUNTER FOR SUPERVISION OF NORMAL PREGNANCY, UNSPECIFIED, UNSPECIFIED TRIMESTER: ICD-10-CM

## 2021-12-03 ENCOUNTER — OUTPATIENT (OUTPATIENT)
Dept: OUTPATIENT SERVICES | Facility: HOSPITAL | Age: 41
LOS: 1 days | End: 2021-12-03
Payer: COMMERCIAL

## 2021-12-03 ENCOUNTER — APPOINTMENT (OUTPATIENT)
Dept: ULTRASOUND IMAGING | Facility: HOSPITAL | Age: 41
End: 2021-12-03

## 2021-12-03 DIAGNOSIS — L72.9 FOLLICULAR CYST OF THE SKIN AND SUBCUTANEOUS TISSUE, UNSPECIFIED: Chronic | ICD-10-CM

## 2021-12-03 DIAGNOSIS — N83.209 UNSPECIFIED OVARIAN CYST, UNSPECIFIED SIDE: ICD-10-CM

## 2021-12-03 PROCEDURE — 76857 US EXAM PELVIC LIMITED: CPT

## 2021-12-03 PROCEDURE — 76857 US EXAM PELVIC LIMITED: CPT | Mod: 26

## 2021-12-15 ENCOUNTER — ASOB RESULT (OUTPATIENT)
Age: 41
End: 2021-12-15

## 2021-12-15 ENCOUNTER — APPOINTMENT (OUTPATIENT)
Dept: ANTEPARTUM | Facility: CLINIC | Age: 41
End: 2021-12-15
Payer: COMMERCIAL

## 2021-12-15 PROCEDURE — 76805 OB US >/= 14 WKS SNGL FETUS: CPT

## 2022-01-07 ENCOUNTER — APPOINTMENT (OUTPATIENT)
Dept: ANTEPARTUM | Facility: CLINIC | Age: 42
End: 2022-01-07

## 2022-01-12 ENCOUNTER — APPOINTMENT (OUTPATIENT)
Dept: ANTEPARTUM | Facility: CLINIC | Age: 42
End: 2022-01-12
Payer: COMMERCIAL

## 2022-01-12 ENCOUNTER — APPOINTMENT (OUTPATIENT)
Dept: MATERNAL FETAL MEDICINE | Facility: CLINIC | Age: 42
End: 2022-01-12

## 2022-01-12 ENCOUNTER — ASOB RESULT (OUTPATIENT)
Age: 42
End: 2022-01-12

## 2022-01-12 ENCOUNTER — APPOINTMENT (OUTPATIENT)
Dept: ANTEPARTUM | Facility: CLINIC | Age: 42
End: 2022-01-12

## 2022-01-12 PROCEDURE — 76817 TRANSVAGINAL US OBSTETRIC: CPT | Mod: 59

## 2022-01-12 PROCEDURE — 99214 OFFICE O/P EST MOD 30 MIN: CPT | Mod: 25

## 2022-01-12 PROCEDURE — 76811 OB US DETAILED SNGL FETUS: CPT

## 2022-01-18 ENCOUNTER — OUTPATIENT (OUTPATIENT)
Dept: OUTPATIENT SERVICES | Age: 42
LOS: 1 days | Discharge: HOME | End: 2022-01-18

## 2022-01-18 DIAGNOSIS — L72.9 FOLLICULAR CYST OF THE SKIN AND SUBCUTANEOUS TISSUE, UNSPECIFIED: Chronic | ICD-10-CM

## 2022-01-19 ENCOUNTER — APPOINTMENT (OUTPATIENT)
Dept: PEDIATRIC CARDIOLOGY | Facility: CLINIC | Age: 42
End: 2022-01-19
Payer: COMMERCIAL

## 2022-01-19 PROCEDURE — 99203 OFFICE O/P NEW LOW 30 MIN: CPT | Mod: 25

## 2022-01-19 PROCEDURE — 76825 ECHO EXAM OF FETAL HEART: CPT

## 2022-01-19 PROCEDURE — 76820 UMBILICAL ARTERY ECHO: CPT

## 2022-01-19 PROCEDURE — 76827 ECHO EXAM OF FETAL HEART: CPT

## 2022-01-19 PROCEDURE — 93325 DOPPLER ECHO COLOR FLOW MAPG: CPT | Mod: 59

## 2022-02-08 ENCOUNTER — ASOB RESULT (OUTPATIENT)
Age: 42
End: 2022-02-08

## 2022-02-08 ENCOUNTER — APPOINTMENT (OUTPATIENT)
Dept: ANTEPARTUM | Facility: CLINIC | Age: 42
End: 2022-02-08
Payer: COMMERCIAL

## 2022-02-08 ENCOUNTER — APPOINTMENT (OUTPATIENT)
Dept: PEDIATRIC CARDIOLOGY | Facility: CLINIC | Age: 42
End: 2022-02-08
Payer: COMMERCIAL

## 2022-02-08 PROCEDURE — 76816 OB US FOLLOW-UP PER FETUS: CPT

## 2022-02-08 PROCEDURE — 76820 UMBILICAL ARTERY ECHO: CPT

## 2022-02-08 PROCEDURE — 93325 DOPPLER ECHO COLOR FLOW MAPG: CPT | Mod: 59

## 2022-02-08 PROCEDURE — 76826 ECHO EXAM OF FETAL HEART: CPT

## 2022-02-08 PROCEDURE — 99214 OFFICE O/P EST MOD 30 MIN: CPT | Mod: 25

## 2022-02-08 PROCEDURE — 76828 ECHO EXAM OF FETAL HEART: CPT

## 2022-03-08 ENCOUNTER — APPOINTMENT (OUTPATIENT)
Dept: ANTEPARTUM | Facility: CLINIC | Age: 42
End: 2022-03-08
Payer: COMMERCIAL

## 2022-03-08 ENCOUNTER — ASOB RESULT (OUTPATIENT)
Age: 42
End: 2022-03-08

## 2022-03-08 PROCEDURE — 76816 OB US FOLLOW-UP PER FETUS: CPT

## 2022-04-05 ENCOUNTER — ASOB RESULT (OUTPATIENT)
Age: 42
End: 2022-04-05

## 2022-04-05 ENCOUNTER — APPOINTMENT (OUTPATIENT)
Dept: ANTEPARTUM | Facility: CLINIC | Age: 42
End: 2022-04-05
Payer: COMMERCIAL

## 2022-04-05 PROCEDURE — 76816 OB US FOLLOW-UP PER FETUS: CPT

## 2022-04-21 ENCOUNTER — OUTPATIENT (OUTPATIENT)
Dept: OUTPATIENT SERVICES | Facility: HOSPITAL | Age: 42
LOS: 1 days | End: 2022-04-21
Payer: COMMERCIAL

## 2022-04-21 VITALS
OXYGEN SATURATION: 98 % | WEIGHT: 268.08 LBS | SYSTOLIC BLOOD PRESSURE: 124 MMHG | DIASTOLIC BLOOD PRESSURE: 70 MMHG | TEMPERATURE: 99 F | HEIGHT: 67 IN | HEART RATE: 88 BPM | RESPIRATION RATE: 16 BRPM

## 2022-04-21 DIAGNOSIS — Z01.818 ENCOUNTER FOR OTHER PREPROCEDURAL EXAMINATION: ICD-10-CM

## 2022-04-21 DIAGNOSIS — L72.9 FOLLICULAR CYST OF THE SKIN AND SUBCUTANEOUS TISSUE, UNSPECIFIED: Chronic | ICD-10-CM

## 2022-04-21 DIAGNOSIS — O34.29 MATERNAL CARE DUE TO UTERINE SCAR FROM OTHER PREVIOUS SURGERY: ICD-10-CM

## 2022-04-21 DIAGNOSIS — Z98.51 TUBAL LIGATION STATUS: Chronic | ICD-10-CM

## 2022-04-21 DIAGNOSIS — Z98.890 OTHER SPECIFIED POSTPROCEDURAL STATES: Chronic | ICD-10-CM

## 2022-04-21 DIAGNOSIS — Z90.49 ACQUIRED ABSENCE OF OTHER SPECIFIED PARTS OF DIGESTIVE TRACT: Chronic | ICD-10-CM

## 2022-04-21 LAB
ANION GAP SERPL CALC-SCNC: 15 MMOL/L — SIGNIFICANT CHANGE UP (ref 5–17)
BLD GP AB SCN SERPL QL: NEGATIVE — SIGNIFICANT CHANGE UP
BUN SERPL-MCNC: 10 MG/DL — SIGNIFICANT CHANGE UP (ref 7–23)
CALCIUM SERPL-MCNC: 9.4 MG/DL — SIGNIFICANT CHANGE UP (ref 8.4–10.5)
CHLORIDE SERPL-SCNC: 106 MMOL/L — SIGNIFICANT CHANGE UP (ref 96–108)
CO2 SERPL-SCNC: 17 MMOL/L — LOW (ref 22–31)
CREAT SERPL-MCNC: 0.44 MG/DL — LOW (ref 0.5–1.3)
EGFR: 125 ML/MIN/1.73M2 — SIGNIFICANT CHANGE UP
GLUCOSE SERPL-MCNC: 83 MG/DL — SIGNIFICANT CHANGE UP (ref 70–99)
HCT VFR BLD CALC: 39.1 % — SIGNIFICANT CHANGE UP (ref 34.5–45)
HGB BLD-MCNC: 13.1 G/DL — SIGNIFICANT CHANGE UP (ref 11.5–15.5)
MCHC RBC-ENTMCNC: 31.9 PG — SIGNIFICANT CHANGE UP (ref 27–34)
MCHC RBC-ENTMCNC: 33.5 GM/DL — SIGNIFICANT CHANGE UP (ref 32–36)
MCV RBC AUTO: 95.1 FL — SIGNIFICANT CHANGE UP (ref 80–100)
NRBC # BLD: 0 /100 WBCS — SIGNIFICANT CHANGE UP (ref 0–0)
PLATELET # BLD AUTO: 211 K/UL — SIGNIFICANT CHANGE UP (ref 150–400)
POTASSIUM SERPL-MCNC: 3.9 MMOL/L — SIGNIFICANT CHANGE UP (ref 3.5–5.3)
POTASSIUM SERPL-SCNC: 3.9 MMOL/L — SIGNIFICANT CHANGE UP (ref 3.5–5.3)
RBC # BLD: 4.11 M/UL — SIGNIFICANT CHANGE UP (ref 3.8–5.2)
RBC # FLD: 13.3 % — SIGNIFICANT CHANGE UP (ref 10.3–14.5)
RH IG SCN BLD-IMP: POSITIVE — SIGNIFICANT CHANGE UP
SODIUM SERPL-SCNC: 138 MMOL/L — SIGNIFICANT CHANGE UP (ref 135–145)
WBC # BLD: 8.85 K/UL — SIGNIFICANT CHANGE UP (ref 3.8–10.5)
WBC # FLD AUTO: 8.85 K/UL — SIGNIFICANT CHANGE UP (ref 3.8–10.5)

## 2022-04-21 PROCEDURE — 86901 BLOOD TYPING SEROLOGIC RH(D): CPT

## 2022-04-21 PROCEDURE — 85027 COMPLETE CBC AUTOMATED: CPT

## 2022-04-21 PROCEDURE — 86900 BLOOD TYPING SEROLOGIC ABO: CPT

## 2022-04-21 PROCEDURE — 86850 RBC ANTIBODY SCREEN: CPT

## 2022-04-21 PROCEDURE — 80048 BASIC METABOLIC PNL TOTAL CA: CPT

## 2022-04-21 PROCEDURE — 36415 COLL VENOUS BLD VENIPUNCTURE: CPT

## 2022-04-21 PROCEDURE — G0463: CPT

## 2022-04-21 RX ORDER — CETIRIZINE HYDROCHLORIDE 10 MG/1
1 TABLET ORAL
Qty: 0 | Refills: 0 | DISCHARGE

## 2022-04-21 RX ORDER — SODIUM CHLORIDE 9 MG/ML
1000 INJECTION, SOLUTION INTRAVENOUS
Refills: 0 | Status: DISCONTINUED | OUTPATIENT
Start: 2022-05-09 | End: 2022-05-09

## 2022-04-21 RX ORDER — L.ACIDOPH/B.ANIMALIS/B.LONGUM 15B CELL
1 CAPSULE ORAL
Qty: 0 | Refills: 0 | DISCHARGE

## 2022-04-21 RX ORDER — CHOLECALCIFEROL (VITAMIN D3) 125 MCG
2 CAPSULE ORAL
Qty: 0 | Refills: 0 | DISCHARGE

## 2022-04-21 RX ORDER — ASCORBIC ACID 60 MG
1 TABLET,CHEWABLE ORAL
Qty: 0 | Refills: 0 | DISCHARGE

## 2022-04-21 RX ORDER — ASPIRIN/CALCIUM CARB/MAGNESIUM 324 MG
1 TABLET ORAL
Qty: 0 | Refills: 0 | DISCHARGE

## 2022-04-21 RX ORDER — OXYTOCIN 10 UNIT/ML
333.33 VIAL (ML) INJECTION
Qty: 20 | Refills: 0 | Status: DISCONTINUED | OUTPATIENT
Start: 2022-05-09 | End: 2022-05-12

## 2022-04-21 NOTE — OB PST NOTE - NSICDXPASTSURGICALHX_GEN_ALL_CORE_FT
PAST SURGICAL HISTORY:  Benign cyst of skin removal at age 16 from temple    History of appendectomy 2019    History of myomectomy 8/2018    History of tubal ligation Excision [id number]

## 2022-04-21 NOTE — OB PST NOTE - HISTORY OF PRESENT ILLNESS
43 yo F   with h/o myomectomy (2018) presenting @ 34 weeks of gestation foe scheduled primary  on 22  **Pt denies any fever, chills, recent travel or sick contacts  **Covid 19 PCR  to be scheduled

## 2022-04-21 NOTE — OB PST NOTE - NSICDXPASTMEDICALHX_GEN_ALL_CORE_FT
PAST MEDICAL HISTORY:  History of cardiac murmur     History of endometriosis     History of ovarian cyst     ANA (iron deficiency anemia)     Low back pain     Obesity BMI 42

## 2022-04-21 NOTE — OB PST NOTE - NSICDXFAMILYHX_GEN_ALL_CORE_FT
FAMILY HISTORY:  Father  Still living? Yes, Estimated age: 71-80  FH: HTN (hypertension), Age at diagnosis: Age Unknown  FH: type 2 diabetes, Age at diagnosis: Age Unknown

## 2022-04-21 NOTE — OB PST NOTE - FALL HARM RISK - UNIVERSAL INTERVENTIONS
Bed in lowest position, wheels locked, appropriate side rails in place/Call bell, personal items and telephone in reach/Instruct patient to call for assistance before getting out of bed or chair/Non-slip footwear when patient is out of bed/[place] to call system/Physically safe environment - no spills, clutter or unnecessary equipment/Purposeful Proactive Rounding/Room/bathroom lighting operational, light cord in reach

## 2022-04-21 NOTE — OB PST NOTE - NSHPPHYSICALEXAM_GEN_ALL_CORE
PHYSICAL EXAM:      Constitutional: not in any distress    Eyes: PERRLA    ENMT: MP2    Neck: supple, no JVD    Breasts: not examined    Back :WNL    Respiratory: CTA bilaterally    Cardiovascular: RRR, S1S2 reg, Gr 2/6 systolic murmur    Gastrointestinal: soft, +BS    Genitourinary: not examined    Rectal: not examined    Extremities: 1+ pedal edema bilaterally    Vascular: WNL    Neurological: WNL    Skin: W&D    Lymph Nodes: none palpable    Musculoskeletal: WNL    Psychiatric: Anxious

## 2022-04-25 PROBLEM — E66.9 OBESITY, UNSPECIFIED: Chronic | Status: ACTIVE | Noted: 2022-04-21

## 2022-04-25 PROBLEM — Z87.42 PERSONAL HISTORY OF OTHER DISEASES OF THE FEMALE GENITAL TRACT: Chronic | Status: ACTIVE | Noted: 2022-04-21

## 2022-04-25 PROBLEM — Z86.79 PERSONAL HISTORY OF OTHER DISEASES OF THE CIRCULATORY SYSTEM: Chronic | Status: ACTIVE | Noted: 2022-04-21

## 2022-05-03 ENCOUNTER — APPOINTMENT (OUTPATIENT)
Dept: ANTEPARTUM | Facility: CLINIC | Age: 42
End: 2022-05-03
Payer: COMMERCIAL

## 2022-05-03 ENCOUNTER — ASOB RESULT (OUTPATIENT)
Age: 42
End: 2022-05-03

## 2022-05-03 PROCEDURE — 76818 FETAL BIOPHYS PROFILE W/NST: CPT

## 2022-05-03 PROCEDURE — 76816 OB US FOLLOW-UP PER FETUS: CPT

## 2022-05-08 ENCOUNTER — TRANSCRIPTION ENCOUNTER (OUTPATIENT)
Age: 42
End: 2022-05-08

## 2022-05-09 ENCOUNTER — INPATIENT (INPATIENT)
Facility: HOSPITAL | Age: 42
LOS: 6 days | Discharge: HOME | End: 2022-05-16
Payer: COMMERCIAL

## 2022-05-09 VITALS
WEIGHT: 270.07 LBS | OXYGEN SATURATION: 100 % | RESPIRATION RATE: 18 BRPM | DIASTOLIC BLOOD PRESSURE: 74 MMHG | TEMPERATURE: 99 F | HEART RATE: 92 BPM | SYSTOLIC BLOOD PRESSURE: 127 MMHG | HEIGHT: 67 IN

## 2022-05-09 DIAGNOSIS — Z90.49 ACQUIRED ABSENCE OF OTHER SPECIFIED PARTS OF DIGESTIVE TRACT: Chronic | ICD-10-CM

## 2022-05-09 DIAGNOSIS — O34.29 MATERNAL CARE DUE TO UTERINE SCAR FROM OTHER PREVIOUS SURGERY: ICD-10-CM

## 2022-05-09 DIAGNOSIS — Z98.890 OTHER SPECIFIED POSTPROCEDURAL STATES: Chronic | ICD-10-CM

## 2022-05-09 DIAGNOSIS — Z98.51 TUBAL LIGATION STATUS: Chronic | ICD-10-CM

## 2022-05-09 DIAGNOSIS — L72.9 FOLLICULAR CYST OF THE SKIN AND SUBCUTANEOUS TISSUE, UNSPECIFIED: Chronic | ICD-10-CM

## 2022-05-09 LAB
BASOPHILS # BLD AUTO: 0.01 K/UL — SIGNIFICANT CHANGE UP (ref 0–0.2)
BASOPHILS NFR BLD AUTO: 0.1 % — SIGNIFICANT CHANGE UP (ref 0–2)
COVID-19 SPIKE DOMAIN AB INTERP: POSITIVE
COVID-19 SPIKE DOMAIN ANTIBODY RESULT: >250 U/ML — HIGH
EOSINOPHIL # BLD AUTO: 0.11 K/UL — SIGNIFICANT CHANGE UP (ref 0–0.5)
EOSINOPHIL NFR BLD AUTO: 1.5 % — SIGNIFICANT CHANGE UP (ref 0–6)
HCT VFR BLD CALC: 38.7 % — SIGNIFICANT CHANGE UP (ref 34.5–45)
HGB BLD-MCNC: 12.6 G/DL — SIGNIFICANT CHANGE UP (ref 11.5–15.5)
IMM GRANULOCYTES NFR BLD AUTO: 0.7 % — SIGNIFICANT CHANGE UP (ref 0–1.5)
LYMPHOCYTES # BLD AUTO: 1.12 K/UL — SIGNIFICANT CHANGE UP (ref 1–3.3)
LYMPHOCYTES # BLD AUTO: 15.2 % — SIGNIFICANT CHANGE UP (ref 13–44)
MCHC RBC-ENTMCNC: 31 PG — SIGNIFICANT CHANGE UP (ref 27–34)
MCHC RBC-ENTMCNC: 32.6 GM/DL — SIGNIFICANT CHANGE UP (ref 32–36)
MCV RBC AUTO: 95.3 FL — SIGNIFICANT CHANGE UP (ref 80–100)
MONOCYTES # BLD AUTO: 0.63 K/UL — SIGNIFICANT CHANGE UP (ref 0–0.9)
MONOCYTES NFR BLD AUTO: 8.5 % — SIGNIFICANT CHANGE UP (ref 2–14)
NEUTROPHILS # BLD AUTO: 5.47 K/UL — SIGNIFICANT CHANGE UP (ref 1.8–7.4)
NEUTROPHILS NFR BLD AUTO: 74 % — SIGNIFICANT CHANGE UP (ref 43–77)
NRBC # BLD: 0 /100 WBCS — SIGNIFICANT CHANGE UP (ref 0–0)
PLATELET # BLD AUTO: 190 K/UL — SIGNIFICANT CHANGE UP (ref 150–400)
RBC # BLD: 4.06 M/UL — SIGNIFICANT CHANGE UP (ref 3.8–5.2)
RBC # FLD: 13.7 % — SIGNIFICANT CHANGE UP (ref 10.3–14.5)
SARS-COV-2 IGG+IGM SERPL QL IA: >250 U/ML — HIGH
SARS-COV-2 IGG+IGM SERPL QL IA: POSITIVE
T PALLIDUM AB TITR SER: NEGATIVE — SIGNIFICANT CHANGE UP
WBC # BLD: 7.39 K/UL — SIGNIFICANT CHANGE UP (ref 3.8–10.5)
WBC # FLD AUTO: 7.39 K/UL — SIGNIFICANT CHANGE UP (ref 3.8–10.5)

## 2022-05-09 RX ORDER — SODIUM CHLORIDE 9 MG/ML
1000 INJECTION, SOLUTION INTRAVENOUS
Refills: 0 | Status: DISCONTINUED | OUTPATIENT
Start: 2022-05-09 | End: 2022-05-12

## 2022-05-09 RX ORDER — GENTAMICIN SULFATE 40 MG/ML
600 VIAL (ML) INJECTION ONCE
Refills: 0 | Status: DISCONTINUED | OUTPATIENT
Start: 2022-05-09 | End: 2022-05-09

## 2022-05-09 RX ORDER — KETOROLAC TROMETHAMINE 30 MG/ML
30 SYRINGE (ML) INJECTION EVERY 6 HOURS
Refills: 0 | Status: DISCONTINUED | OUTPATIENT
Start: 2022-05-09 | End: 2022-05-10

## 2022-05-09 RX ORDER — LANOLIN
1 OINTMENT (GRAM) TOPICAL EVERY 6 HOURS
Refills: 0 | Status: DISCONTINUED | OUTPATIENT
Start: 2022-05-09 | End: 2022-05-16

## 2022-05-09 RX ORDER — MORPHINE SULFATE 50 MG/1
0.1 CAPSULE, EXTENDED RELEASE ORAL ONCE
Refills: 0 | Status: DISCONTINUED | OUTPATIENT
Start: 2022-05-09 | End: 2022-05-10

## 2022-05-09 RX ORDER — SIMETHICONE 80 MG/1
80 TABLET, CHEWABLE ORAL EVERY 4 HOURS
Refills: 0 | Status: DISCONTINUED | OUTPATIENT
Start: 2022-05-09 | End: 2022-05-12

## 2022-05-09 RX ORDER — OXYTOCIN 10 UNIT/ML
333.33 VIAL (ML) INJECTION
Qty: 20 | Refills: 0 | Status: DISCONTINUED | OUTPATIENT
Start: 2022-05-09 | End: 2022-05-12

## 2022-05-09 RX ORDER — IBUPROFEN 200 MG
600 TABLET ORAL EVERY 6 HOURS
Refills: 0 | Status: COMPLETED | OUTPATIENT
Start: 2022-05-09 | End: 2023-04-07

## 2022-05-09 RX ORDER — ACETAMINOPHEN 500 MG
975 TABLET ORAL
Refills: 0 | Status: DISCONTINUED | OUTPATIENT
Start: 2022-05-09 | End: 2022-05-12

## 2022-05-09 RX ORDER — SODIUM CHLORIDE 9 MG/ML
1000 INJECTION, SOLUTION INTRAVENOUS ONCE
Refills: 0 | Status: COMPLETED | OUTPATIENT
Start: 2022-05-09 | End: 2022-05-09

## 2022-05-09 RX ORDER — TETANUS TOXOID, REDUCED DIPHTHERIA TOXOID AND ACELLULAR PERTUSSIS VACCINE, ADSORBED 5; 2.5; 8; 8; 2.5 [IU]/.5ML; [IU]/.5ML; UG/.5ML; UG/.5ML; UG/.5ML
0.5 SUSPENSION INTRAMUSCULAR ONCE
Refills: 0 | Status: DISCONTINUED | OUTPATIENT
Start: 2022-05-09 | End: 2022-05-16

## 2022-05-09 RX ORDER — FAMOTIDINE 10 MG/ML
20 INJECTION INTRAVENOUS ONCE
Refills: 0 | Status: COMPLETED | OUTPATIENT
Start: 2022-05-09 | End: 2022-05-09

## 2022-05-09 RX ORDER — DEXAMETHASONE 0.5 MG/5ML
4 ELIXIR ORAL EVERY 6 HOURS
Refills: 0 | Status: DISCONTINUED | OUTPATIENT
Start: 2022-05-09 | End: 2022-05-10

## 2022-05-09 RX ORDER — DIPHENHYDRAMINE HCL 50 MG
25 CAPSULE ORAL EVERY 4 HOURS
Refills: 0 | Status: DISCONTINUED | OUTPATIENT
Start: 2022-05-09 | End: 2022-05-12

## 2022-05-09 RX ORDER — OXYCODONE HYDROCHLORIDE 5 MG/1
5 TABLET ORAL
Refills: 0 | Status: COMPLETED | OUTPATIENT
Start: 2022-05-09 | End: 2022-05-16

## 2022-05-09 RX ORDER — CHLORHEXIDINE GLUCONATE 213 G/1000ML
1 SOLUTION TOPICAL ONCE
Refills: 0 | Status: COMPLETED | OUTPATIENT
Start: 2022-05-09 | End: 2022-05-09

## 2022-05-09 RX ORDER — GENTAMICIN SULFATE 40 MG/ML
480 VIAL (ML) INJECTION ONCE
Refills: 0 | Status: COMPLETED | OUTPATIENT
Start: 2022-05-09 | End: 2022-05-09

## 2022-05-09 RX ORDER — MAGNESIUM HYDROXIDE 400 MG/1
30 TABLET, CHEWABLE ORAL
Refills: 0 | Status: DISCONTINUED | OUTPATIENT
Start: 2022-05-09 | End: 2022-05-12

## 2022-05-09 RX ORDER — NALBUPHINE HYDROCHLORIDE 10 MG/ML
2.5 INJECTION, SOLUTION INTRAMUSCULAR; INTRAVENOUS; SUBCUTANEOUS ONCE
Refills: 0 | Status: COMPLETED | OUTPATIENT
Start: 2022-05-09 | End: 2022-05-09

## 2022-05-09 RX ORDER — CITRIC ACID/SODIUM CITRATE 300-500 MG
30 SOLUTION, ORAL ORAL ONCE
Refills: 0 | Status: COMPLETED | OUTPATIENT
Start: 2022-05-09 | End: 2022-05-09

## 2022-05-09 RX ORDER — NALOXONE HYDROCHLORIDE 4 MG/.1ML
0.1 SPRAY NASAL
Refills: 0 | Status: DISCONTINUED | OUTPATIENT
Start: 2022-05-09 | End: 2022-05-10

## 2022-05-09 RX ORDER — DIPHENHYDRAMINE HCL 50 MG
25 CAPSULE ORAL EVERY 6 HOURS
Refills: 0 | Status: DISCONTINUED | OUTPATIENT
Start: 2022-05-09 | End: 2022-05-12

## 2022-05-09 RX ORDER — ONDANSETRON 8 MG/1
4 TABLET, FILM COATED ORAL EVERY 6 HOURS
Refills: 0 | Status: DISCONTINUED | OUTPATIENT
Start: 2022-05-09 | End: 2022-05-10

## 2022-05-09 RX ORDER — HEPARIN SODIUM 5000 [USP'U]/ML
10000 INJECTION INTRAVENOUS; SUBCUTANEOUS EVERY 12 HOURS
Refills: 0 | Status: DISCONTINUED | OUTPATIENT
Start: 2022-05-09 | End: 2022-05-12

## 2022-05-09 RX ORDER — OXYCODONE HYDROCHLORIDE 5 MG/1
5 TABLET ORAL ONCE
Refills: 0 | Status: DISCONTINUED | OUTPATIENT
Start: 2022-05-09 | End: 2022-05-12

## 2022-05-09 RX ADMIN — SODIUM CHLORIDE 2000 MILLILITER(S): 9 INJECTION, SOLUTION INTRAVENOUS at 08:58

## 2022-05-09 RX ADMIN — Medication 1000 MILLIUNIT(S)/MIN: at 10:35

## 2022-05-09 RX ADMIN — Medication 25 MILLIGRAM(S): at 22:46

## 2022-05-09 RX ADMIN — CHLORHEXIDINE GLUCONATE 1 APPLICATION(S): 213 SOLUTION TOPICAL at 07:50

## 2022-05-09 RX ADMIN — Medication 200 MILLIGRAM(S): at 09:50

## 2022-05-09 RX ADMIN — Medication 30 MILLIGRAM(S): at 18:36

## 2022-05-09 RX ADMIN — Medication 975 MILLIGRAM(S): at 21:23

## 2022-05-09 RX ADMIN — FAMOTIDINE 20 MILLIGRAM(S): 10 INJECTION INTRAVENOUS at 08:55

## 2022-05-09 RX ADMIN — Medication 100 MILLIGRAM(S): at 09:50

## 2022-05-09 RX ADMIN — Medication 975 MILLIGRAM(S): at 20:56

## 2022-05-09 RX ADMIN — NALBUPHINE HYDROCHLORIDE 2.5 MILLIGRAM(S): 10 INJECTION, SOLUTION INTRAMUSCULAR; INTRAVENOUS; SUBCUTANEOUS at 15:45

## 2022-05-09 RX ADMIN — HEPARIN SODIUM 10000 UNIT(S): 5000 INJECTION INTRAVENOUS; SUBCUTANEOUS at 18:36

## 2022-05-09 RX ADMIN — ONDANSETRON 4 MILLIGRAM(S): 8 TABLET, FILM COATED ORAL at 14:35

## 2022-05-09 RX ADMIN — Medication 30 MILLILITER(S): at 08:58

## 2022-05-09 NOTE — OB PROVIDER H&P - HISTORY OF PRESENT ILLNESS
R2 H&P    Pt is a 43 y/o  at 37w2d admitted for scheduled pCS  Patient denies contractions, vaginal bleeding, LOF, FM felt.   Last PO intake  Prenatal course uncomplicated  GBS   EFW 7lbs 1oz - 3200g    OBHx: G1- current  GynHx: +endometriosis, +fibroids, + R ovarian cyst s/p cystectomy, +hx abnormal PAPs (most recent pap wnl), denies hx of STI  PMHx: denies  PSHx:   2018 - abdominal myomectomy  2019 - RA LSC BS, excision of endometriosis, R ovarian cystectomy, myomectomy, cystoscopy, D&Cx1, hysteroscopy    Med: denies  All: NKDA  SH: denies t/a/d  Psych: +anxiety/depression (sees therapist)

## 2022-05-09 NOTE — OB RN PATIENT PROFILE - NSICDXFAMILYHX_GEN_ALL_CORE_FT
Patient expressed no known problems or needs
FAMILY HISTORY:  Father  Still living? Yes, Estimated age: 71-80  FH: HTN (hypertension), Age at diagnosis: Age Unknown  FH: type 2 diabetes, Age at diagnosis: Age Unknown

## 2022-05-09 NOTE — OB RN PATIENT PROFILE - BSA (M2)
Patient resting with mother at the bedside. ID band confirmed/intact. Patient is now afebrile. Being transported to [facility] now. WIll continue to reassess.
2.3

## 2022-05-09 NOTE — PRE-OP CHECKLIST -  HIBICLENS SHOWER 2 DATE
Initial Anesthesia Post-op Note    Patient: Haile Hoffman  Procedure(s) Performed: RIGHT KNEE ARTHROSCOPY WITH PARTIAL MEDIAL MENISCECTOMY - RIGHTORIF, FRACTURE, CLAVICLE - LEFT  Anesthesia type: General    Vitals Value Taken Time   Temp 35.7 °C (96.2 °F) 05/09/22 1230   Pulse 69 05/09/22 1230   Resp 13 05/09/22 1230   SpO2 97 % 05/09/22 1230   /108 05/09/22 1230   Vitals shown include unvalidated device data.      Patient Location: PACU Phase 1  Post-op Vital Signs:stable  Level of Consciousness: lethargic and sedated  Respiratory Status: spontaneous ventilation, face mask and oral airway  Cardiovascular hypertensive  Hydration: euvolemic  Pain Management: well controlled  Handoff: Handoff to receiving clinician was performed and questions were answered  Vomiting: none  Nausea: None  Airway Patency:oral airway  Post-op Assessment: no complications and patient tolerated procedure well with no complications      No complications documented.  
20-Aug-2018

## 2022-05-09 NOTE — PRE-ANESTHESIA EVALUATION ADULT - NSANTHPMHFT_GEN_ALL_CORE
43 yo F   with h/o myomectomy (2018) with extensive scar tissue, endometriosis, BMI 42, history of cardiac murmur (follows with cardiologist with yearly echos, reportedly benign murmur). Intolerance to hydrocodone (insomnia, shivering). She thinks she has had percocet in the past with no issues.

## 2022-05-09 NOTE — OB RN INTRAOPERATIVE NOTE - NSSELHIDDEN_OBGYN_ALL_OB_FT
[NS_DeliveryAttending1_OBGYN_ALL_OB_FT:[id number]==],[NS_CirculateRN2_OBGYN_ALL_OB_FT:MzIzNzIzMDExOTA=] [NS_DeliveryAttending1_OBGYN_ALL_OB_FT:[id number]==],[NS_CirculateRN2_OBGYN_ALL_OB_FT:[email or web address]=],[NS_DeliveryRN_OBGYN_ALL_OB_FT:MzIzNzIzMDExOTA=],[NS_DeliveryAssist1_OBGYN_ALL_OB_FT:[email or web address]=] [NS_DeliveryAttending1_OBGYN_ALL_OB_FT:[id number]==],[NS_CirculateRN2_OBGYN_ALL_OB_FT:MzIzNzIzMDExOTA=],[NS_DeliveryRN_OBGYN_ALL_OB_FT:MzIzNzIzMDExOTA=],[NS_DeliveryAssist1_OBGYN_ALL_OB_FT:[email or web address]=],[NS_DeliveryAssist2_OBGYN_ALL_OB_FT:[id number]=]

## 2022-05-09 NOTE — OB NEONATOLOGY/PEDIATRICIAN DELIVERY SUMMARY - NSPEDSNEONOTESA_OBGYN_ALL_OB_FT
Pediatrician called to delivery for c/s due to prior abdominal myomectomy and R ovarian cystectomy. Male infant born at 37+2wks via  to a 43 y/o  blood type A+ mother. Maternal history of anxiety and depression seeking therapy. Prenatal history of IVF pregnancy, normal fetal echo with limitations, if  clinical concerns (i.e. murmur), obtain ped cardiac evaluation. Prenatal labs HIV neg, HBsAg neg, Rubella immune, RPR pending, GBS unknown, COVID neg. ROM during delivery with clear fluids. Baby emerged vigorous, crying. Infant was brought to radiant warmer and warmed, dried, stimulated and suctioned. HR>100, normal respiratory effort. APGARS of 8/9. Mom is initiating breast feeding. Consents to Hepatitis B vaccination. Desires for infant to be circumcised. Pediatrician is Елена  Baby stable for transfer to  nursery. Parents updated. Pediatrician called to delivery for c/s due to breech presentation and prior maternal abdominal myomectomy and R ovarian cystectomy. Male infant born at 37+2wks via  to a 42 y/o  blood type A+ mother. Maternal history of anxiety and depression seeking therapy. Prenatal history of IVF pregnancy, normal fetal echo with limitations, if  clinical concerns (i.e. murmur), obtain ped cardiac evaluation. Prenatal labs HIV neg, HBsAg neg, Rubella immune, RPR pending, GBS unknown, COVID neg. ROM during delivery with clear fluids. Baby emerged vigorous, crying. Infant was brought to radiant warmer and warmed, dried, stimulated and suctioned. HR>100, normal respiratory effort. APGARS of 8/9. Mom is initiating breast feeding. Consents to Hepatitis B vaccination. Desires for infant to be circumcised. Pediatrician is Елена  Baby stable for transfer to  nursery. Parents updated.

## 2022-05-09 NOTE — OB PROVIDER DELIVERY SUMMARY - NSPROVIDERDELIVERYNOTE_OBGYN_ALL_OB_FT
Pt was delivered of a viable baby boy breech extraction. LSA, apgars 8/9, wt 6'15" at 10:34/  Placenta was at 1028 intact with three vessels in the cord.  Vertical abdominal incision  Clindamycin and Gerntamycin  betadine prep   ml   ml   ml  FI 2100 ML Pt was delivered of a viable baby boy breech extraction. LSA, apgars 8/9, wt 6'15" at 10:34/  Placenta was at 1028 intact with three vessels in the cord.  Vertical abdominal incision  Clindamycin 900 mg and Gerntamycin 480 mg  betadine prep, bladder filled with methylene blue at the end of the case with no spillage evident   ml   ml   ml  FI 2100 ML

## 2022-05-09 NOTE — OB PROVIDER H&P - NSICDXPASTSURGICALHX_GEN_ALL_CORE_FT
PAST SURGICAL HISTORY:  Benign cyst of skin removal at age 16 from temple    History of appendectomy 2019    History of myomectomy 8/2018    History of tubal ligation Excision [id number]

## 2022-05-09 NOTE — OB RN PATIENT PROFILE - ANESTHESIA, PREVIOUS REACTION, PROFILE
Autumn Gibbons)  Pediatrics  [address] [place] [address]  Phone: [phone]  Fax: [phone]  Follow Up Time: 1-3 days
none

## 2022-05-09 NOTE — OB PROVIDER DELIVERY SUMMARY - NSSELHIDDEN_OBGYN_ALL_OB_FT
[NS_DeliveryAttending1_OBGYN_ALL_OB_FT:[id number]==],[NS_CirculateRN2_OBGYN_ALL_OB_FT:MzIzNzIzMDExOTA=],[NS_DeliveryRN_OBGYN_ALL_OB_FT:MzIzNzIzMDExOTA=],[NS_DeliveryAssist1_OBGYN_ALL_OB_FT:MjkyMTQyMDExOTA=]

## 2022-05-09 NOTE — OB PROVIDER H&P - NSHPPHYSICALEXAM_GEN_ALL_CORE
VS:  Vital Signs Last 24 Hrs  T(C): 37.1 (09 May 2022 07:41), Max: 37.1 (09 May 2022 07:41)  T(F): 98.8 (09 May 2022 07:41), Max: 98.8 (09 May 2022 07:41)  HR: 101 (09 May 2022 08:28) (92 - 101)  BP: 127/74 (09 May 2022 08:07) (127/74 - 141/82)  BP(mean): --  RR: 18 (09 May 2022 07:41) (18 - 18)  SpO2: 100% (09 May 2022 08:28) (100% - 100%)  GA: NAD  Cards: RRR  Pulm: CTAB  EFH: baseline 150 ,moderate variability, +accels, -decels   [place]: flat  VE:deferred

## 2022-05-09 NOTE — OB RN INTRAOPERATIVE NOTE - NS_ORROOM _OBGYN_ALL_OB
Pt states that she took what she thought was a tylenol last PM and woke up this morning with upper lip swollen. Pt states that she realized it was PCN, which she is allergic to.  Pt states that she used her epi pen and took 2 benadryl at 6 AM.
OR-B

## 2022-05-09 NOTE — OB PROVIDER H&P - ASSESSMENT
A&P:   41 y/o  at 37w2d admitted for pCS in the setting of prior abd myomectomy and extensive endometriosis surgery.     Labor: admit for scheduled CS  -routine labs  -EFM/toco  -NPO, IV hydration  -Pepcid/reglan/bicitra    Fetal: cat 1 tracing, fetal status reassuring  GBS: neg    plan per Dr. Jonh Wall  PGY-2

## 2022-05-09 NOTE — OB RN PATIENT PROFILE - NSICDXPASTSURGICALHX_GEN_ALL_CORE_FT
PAST SURGICAL HISTORY:  Benign cyst of skin removal at age 16 from temple    History of appendectomy 2019    History of myomectomy 8/2018    History of tubal ligation Excision [id number]

## 2022-05-09 NOTE — OB RN DELIVERY SUMMARY - NS_SEPSISRSKCALC_OBGYN_ALL_OB_FT
EOS calculated successfully. EOS Risk Factor: 0.5/1000 live births ([facility] national incidence); GA=37w2d; Temp=98.8; ROM=0.017; GBS='Negative'; Antibiotics='Broad spectrum antibiotics 2-3.9 hrs prior to birth'

## 2022-05-09 NOTE — OB RN DELIVERY SUMMARY - NSSELHIDDEN_OBGYN_ALL_OB_FT
[NS_DeliveryAttending1_OBGYN_ALL_OB_FT:[id number]==],[NS_CirculateRN2_OBGYN_ALL_OB_FT:MzIzNzIzMDExOTA=],[NS_DeliveryRN_OBGYN_ALL_OB_FT:MzIzNzIzMDExOTA=],[NS_DeliveryAssist1_OBGYN_ALL_OB_FT:MjkyMTQyMDExOTA=]

## 2022-05-10 ENCOUNTER — TRANSCRIPTION ENCOUNTER (OUTPATIENT)
Age: 42
End: 2022-05-10

## 2022-05-10 LAB
BASOPHILS # BLD AUTO: 0.03 K/UL — SIGNIFICANT CHANGE UP (ref 0–0.2)
BASOPHILS NFR BLD AUTO: 0.3 % — SIGNIFICANT CHANGE UP (ref 0–2)
EOSINOPHIL # BLD AUTO: 0.04 K/UL — SIGNIFICANT CHANGE UP (ref 0–0.5)
EOSINOPHIL NFR BLD AUTO: 0.4 % — SIGNIFICANT CHANGE UP (ref 0–6)
HCT VFR BLD CALC: 30.3 % — LOW (ref 34.5–45)
HGB BLD-MCNC: 10.1 G/DL — LOW (ref 11.5–15.5)
IMM GRANULOCYTES NFR BLD AUTO: 0.4 % — SIGNIFICANT CHANGE UP (ref 0–1.5)
LYMPHOCYTES # BLD AUTO: 1.74 K/UL — SIGNIFICANT CHANGE UP (ref 1–3.3)
LYMPHOCYTES # BLD AUTO: 15.6 % — SIGNIFICANT CHANGE UP (ref 13–44)
MCHC RBC-ENTMCNC: 31.9 PG — SIGNIFICANT CHANGE UP (ref 27–34)
MCHC RBC-ENTMCNC: 33.3 GM/DL — SIGNIFICANT CHANGE UP (ref 32–36)
MCV RBC AUTO: 95.6 FL — SIGNIFICANT CHANGE UP (ref 80–100)
MONOCYTES # BLD AUTO: 1.1 K/UL — HIGH (ref 0–0.9)
MONOCYTES NFR BLD AUTO: 9.9 % — SIGNIFICANT CHANGE UP (ref 2–14)
NEUTROPHILS # BLD AUTO: 8.18 K/UL — HIGH (ref 1.8–7.4)
NEUTROPHILS NFR BLD AUTO: 73.4 % — SIGNIFICANT CHANGE UP (ref 43–77)
NRBC # BLD: 0 /100 WBCS — SIGNIFICANT CHANGE UP (ref 0–0)
PLATELET # BLD AUTO: 165 K/UL — SIGNIFICANT CHANGE UP (ref 150–400)
RBC # BLD: 3.17 M/UL — LOW (ref 3.8–5.2)
RBC # FLD: 13.7 % — SIGNIFICANT CHANGE UP (ref 10.3–14.5)
WBC # BLD: 11.14 K/UL — HIGH (ref 3.8–10.5)
WBC # FLD AUTO: 11.14 K/UL — HIGH (ref 3.8–10.5)

## 2022-05-10 PROCEDURE — 93010 ELECTROCARDIOGRAM REPORT: CPT

## 2022-05-10 RX ORDER — LANOLIN
1 OINTMENT (GRAM) TOPICAL
Qty: 0 | Refills: 0 | DISCHARGE
Start: 2022-05-10

## 2022-05-10 RX ORDER — OXYCODONE HYDROCHLORIDE 5 MG/1
5 TABLET ORAL
Refills: 0 | Status: DISCONTINUED | OUTPATIENT
Start: 2022-05-10 | End: 2022-05-12

## 2022-05-10 RX ORDER — MAGNESIUM HYDROXIDE 400 MG/1
30 TABLET, CHEWABLE ORAL
Qty: 0 | Refills: 0 | DISCHARGE
Start: 2022-05-10

## 2022-05-10 RX ORDER — KETOROLAC TROMETHAMINE 30 MG/ML
30 SYRINGE (ML) INJECTION ONCE
Refills: 0 | Status: DISCONTINUED | OUTPATIENT
Start: 2022-05-10 | End: 2022-05-10

## 2022-05-10 RX ORDER — ACETAMINOPHEN 500 MG
3 TABLET ORAL
Qty: 0 | Refills: 0 | DISCHARGE
Start: 2022-05-10

## 2022-05-10 RX ORDER — IBUPROFEN 200 MG
600 TABLET ORAL EVERY 6 HOURS
Refills: 0 | Status: DISCONTINUED | OUTPATIENT
Start: 2022-05-10 | End: 2022-05-12

## 2022-05-10 RX ORDER — NALBUPHINE HYDROCHLORIDE 10 MG/ML
2.5 INJECTION, SOLUTION INTRAMUSCULAR; INTRAVENOUS; SUBCUTANEOUS ONCE
Refills: 0 | Status: COMPLETED | OUTPATIENT
Start: 2022-05-10 | End: 2022-05-10

## 2022-05-10 RX ORDER — IBUPROFEN 200 MG
1 TABLET ORAL
Qty: 120 | Refills: 0
Start: 2022-05-10 | End: 2022-06-08

## 2022-05-10 RX ORDER — ACETAMINOPHEN 500 MG
1000 TABLET ORAL ONCE
Refills: 0 | Status: COMPLETED | OUTPATIENT
Start: 2022-05-10 | End: 2022-05-10

## 2022-05-10 RX ORDER — CHOLECALCIFEROL (VITAMIN D3) 125 MCG
1 CAPSULE ORAL
Qty: 0 | Refills: 0 | DISCHARGE

## 2022-05-10 RX ORDER — OXYCODONE HYDROCHLORIDE 5 MG/1
5 TABLET ORAL ONCE
Refills: 0 | Status: DISCONTINUED | OUTPATIENT
Start: 2022-05-10 | End: 2022-05-10

## 2022-05-10 RX ORDER — KETOROLAC TROMETHAMINE 30 MG/ML
30 SYRINGE (ML) INJECTION ONCE
Refills: 0 | Status: DISCONTINUED | OUTPATIENT
Start: 2022-05-10 | End: 2022-05-11

## 2022-05-10 RX ORDER — SODIUM CHLORIDE 9 MG/ML
1000 INJECTION, SOLUTION INTRAVENOUS
Refills: 0 | Status: COMPLETED | OUTPATIENT
Start: 2022-05-10 | End: 2022-05-11

## 2022-05-10 RX ORDER — GENTAMICIN SULFATE 40 MG/ML
430 VIAL (ML) INJECTION ONCE
Refills: 0 | Status: COMPLETED | OUTPATIENT
Start: 2022-05-10 | End: 2022-05-11

## 2022-05-10 RX ADMIN — NALBUPHINE HYDROCHLORIDE 2.5 MILLIGRAM(S): 10 INJECTION, SOLUTION INTRAMUSCULAR; INTRAVENOUS; SUBCUTANEOUS at 08:26

## 2022-05-10 RX ADMIN — Medication 30 MILLIGRAM(S): at 17:21

## 2022-05-10 RX ADMIN — SIMETHICONE 80 MILLIGRAM(S): 80 TABLET, CHEWABLE ORAL at 19:50

## 2022-05-10 RX ADMIN — Medication 30 MILLIGRAM(S): at 17:36

## 2022-05-10 RX ADMIN — Medication 600 MILLIGRAM(S): at 11:40

## 2022-05-10 RX ADMIN — OXYCODONE HYDROCHLORIDE 5 MILLIGRAM(S): 5 TABLET ORAL at 15:04

## 2022-05-10 RX ADMIN — Medication 30 MILLIGRAM(S): at 00:19

## 2022-05-10 RX ADMIN — OXYCODONE HYDROCHLORIDE 5 MILLIGRAM(S): 5 TABLET ORAL at 18:22

## 2022-05-10 RX ADMIN — Medication 400 MILLIGRAM(S): at 21:19

## 2022-05-10 RX ADMIN — SIMETHICONE 80 MILLIGRAM(S): 80 TABLET, CHEWABLE ORAL at 15:21

## 2022-05-10 RX ADMIN — Medication 975 MILLIGRAM(S): at 08:26

## 2022-05-10 RX ADMIN — HEPARIN SODIUM 10000 UNIT(S): 5000 INJECTION INTRAVENOUS; SUBCUTANEOUS at 17:22

## 2022-05-10 RX ADMIN — NALBUPHINE HYDROCHLORIDE 2.5 MILLIGRAM(S): 10 INJECTION, SOLUTION INTRAMUSCULAR; INTRAVENOUS; SUBCUTANEOUS at 09:14

## 2022-05-10 RX ADMIN — OXYCODONE HYDROCHLORIDE 5 MILLIGRAM(S): 5 TABLET ORAL at 14:12

## 2022-05-10 RX ADMIN — HEPARIN SODIUM 10000 UNIT(S): 5000 INJECTION INTRAVENOUS; SUBCUTANEOUS at 06:02

## 2022-05-10 RX ADMIN — Medication 30 MILLIGRAM(S): at 06:02

## 2022-05-10 RX ADMIN — Medication 30 MILLIGRAM(S): at 06:49

## 2022-05-10 RX ADMIN — OXYCODONE HYDROCHLORIDE 5 MILLIGRAM(S): 5 TABLET ORAL at 17:22

## 2022-05-10 RX ADMIN — Medication 975 MILLIGRAM(S): at 02:19

## 2022-05-10 RX ADMIN — OXYCODONE HYDROCHLORIDE 5 MILLIGRAM(S): 5 TABLET ORAL at 21:19

## 2022-05-10 RX ADMIN — Medication 975 MILLIGRAM(S): at 15:04

## 2022-05-10 RX ADMIN — OXYCODONE HYDROCHLORIDE 5 MILLIGRAM(S): 5 TABLET ORAL at 15:21

## 2022-05-10 RX ADMIN — OXYCODONE HYDROCHLORIDE 5 MILLIGRAM(S): 5 TABLET ORAL at 16:04

## 2022-05-10 RX ADMIN — Medication 975 MILLIGRAM(S): at 09:14

## 2022-05-10 RX ADMIN — Medication 1000 MILLIGRAM(S): at 22:08

## 2022-05-10 RX ADMIN — Medication 600 MILLIGRAM(S): at 12:40

## 2022-05-10 RX ADMIN — Medication 30 MILLIGRAM(S): at 01:14

## 2022-05-10 RX ADMIN — SODIUM CHLORIDE 1000 MILLILITER(S): 9 INJECTION, SOLUTION INTRAVENOUS at 23:51

## 2022-05-10 RX ADMIN — Medication 975 MILLIGRAM(S): at 03:25

## 2022-05-10 RX ADMIN — Medication 975 MILLIGRAM(S): at 16:04

## 2022-05-10 NOTE — PROGRESS NOTE ADULT - SUBJECTIVE AND OBJECTIVE BOX
Patient seen and examined at bedside, no acute overnight events. No acute complaints, pain well controlled. Patient is ambulating and tolerating regular diet. Passing flatus, voiding spontaneously. Denies CP, SOB, N/V, HA, blurred vision, epigastric pain.    Vital Signs Last 24 Hours  T(C): 36.5 (05-10-22 @ 05:45), Max: 37.1 (05-09-22 @ 07:41)  HR: 82 (05-10-22 @ 05:45) (74 - 101)  BP: 97/63 (05-10-22 @ 05:45) (95/61 - 141/82)  RR: 18 (05-10-22 @ 05:45) (15 - 25)  SpO2: 97% (05-10-22 @ 05:45) (97% - 100%)    I&O's Summary    09 May 2022 07:01  -  10 May 2022 06:54  --------------------------------------------------------  IN: 860 mL / OUT: 1400 mL / NET: -540 mL      Physical Exam:  General: NAD  Abdomen: Soft, non-tender, non-distended, fundus firm  Incision: midline incision C/D/I w/ staples, subcuticular suture closure  Pelvic: Lochia wnl, external exam of perineum clean and dry without swelling    Labs:    Blood Type: A Positive  Antibody Screen: Negative  RPR: Negative               12.6   7.39  )-----------( 190      ( 05-09 @ 07:57 )             38.7                13.1   8.85  )-----------( 211      ( 04-21 @ 16:59 )             39.1         MEDICATIONS  (STANDING):  acetaminophen     Tablet .. 975 milliGRAM(s) Oral <User Schedule>  diphtheria/tetanus/pertussis (acellular) Vaccine (ADAcel) 0.5 milliLiter(s) IntraMuscular once  heparin   Injectable 55562 Unit(s) SubCutaneous every 12 hours  ibuprofen  Tablet. 600 milliGRAM(s) Oral every 6 hours  lactated ringers. 1000 milliLiter(s) (125 mL/Hr) IV Continuous <Continuous>  morphine PF Spinal 0.1 milliGRAM(s) IntraThecal. once  oxytocin Infusion 333.333 milliUNIT(s)/Min (1000 mL/Hr) IV Continuous <Continuous>  oxytocin Infusion 333.333 milliUNIT(s)/Min (1000 mL/Hr) IV Continuous <Continuous>    MEDICATIONS  (PRN):  dexAMETHasone  Injectable 4 milliGRAM(s) IV Push every 6 hours PRN Nausea  diphenhydrAMINE 25 milliGRAM(s) Oral every 6 hours PRN Pruritus  diphenhydrAMINE 25 milliGRAM(s) Oral every 4 hours PRN Rash and/or Itching  lanolin Ointment 1 Application(s) Topical every 6 hours PRN Sore Nipples  magnesium hydroxide Suspension 30 milliLiter(s) Oral two times a day PRN Constipation  naloxone Injectable 0.1 milliGRAM(s) IV Push every 3 minutes PRN For ANY of the following changes in patient status:  A. Breaths Per Minute LESS THAN 10, B. Oxygen saturation LESS THAN 90%, C. Sedation score of 6 for Stop After: 4 Times  ondansetron Injectable 4 milliGRAM(s) IV Push every 6 hours PRN Nausea  oxyCODONE    IR 5 milliGRAM(s) Oral every 3 hours PRN Moderate to Severe Pain (4-10)  oxyCODONE    IR 5 milliGRAM(s) Oral once PRN Moderate to Severe Pain (4-10)  simethicone 80 milliGRAM(s) Chew every 4 hours PRN Gas

## 2022-05-10 NOTE — PROGRESS NOTE ADULT - ASSESSMENT
41y/o  POD#1 from primary mid transverse  section for prior myometctomy . Patient is currently in stable condition.    #Routine Postpartum Care  - Continue with PO pain management  - Increase ambulation  - Continue regular diet  - POD#1 H/H 10.1/30.3 from 12.6/38.7, stable  - Incision dressing removed POD1    Lizzy Koehler  PGY-1

## 2022-05-10 NOTE — DISCHARGE NOTE OB - PROVIDER TOKENS
PROVIDER:[TOKEN:[[id number]],FOLLOWUP:[1 week],ESTABLISHEDPATIENT:[T]] PROVIDER:[TOKEN:[[id number]],FOLLOWUP:[1-3 days],ESTABLISHEDPATIENT:[T]]

## 2022-05-10 NOTE — PROGRESS NOTE ADULT - SUBJECTIVE AND OBJECTIVE BOX
Day 1 of Anesthesia Pain Management Service    SUBJECTIVE: Doing ok  Pain Scale Score:          [X] Refer to charted pain scores    THERAPY:    s/p    100 mcg PF morphine on 5\9\2022      MEDICATIONS  (STANDING):  acetaminophen     Tablet .. 975 milliGRAM(s) Oral <User Schedule>  diphtheria/tetanus/pertussis (acellular) Vaccine (ADAcel) 0.5 milliLiter(s) IntraMuscular once  heparin   Injectable 45434 Unit(s) SubCutaneous every 12 hours  ibuprofen  Tablet. 600 milliGRAM(s) Oral every 6 hours  lactated ringers. 1000 milliLiter(s) (125 mL/Hr) IV Continuous <Continuous>  morphine PF Spinal 0.1 milliGRAM(s) IntraThecal. once  oxytocin Infusion 333.333 milliUNIT(s)/Min (1000 mL/Hr) IV Continuous <Continuous>  oxytocin Infusion 333.333 milliUNIT(s)/Min (1000 mL/Hr) IV Continuous <Continuous>    MEDICATIONS  (PRN):  dexAMETHasone  Injectable 4 milliGRAM(s) IV Push every 6 hours PRN Nausea  diphenhydrAMINE 25 milliGRAM(s) Oral every 6 hours PRN Pruritus  diphenhydrAMINE 25 milliGRAM(s) Oral every 4 hours PRN Rash and/or Itching  lanolin Ointment 1 Application(s) Topical every 6 hours PRN Sore Nipples  magnesium hydroxide Suspension 30 milliLiter(s) Oral two times a day PRN Constipation  naloxone Injectable 0.1 milliGRAM(s) IV Push every 3 minutes PRN For ANY of the following changes in patient status:  A. Breaths Per Minute LESS THAN 10, B. Oxygen saturation LESS THAN 90%, C. Sedation score of 6 for Stop After: 4 Times  ondansetron Injectable 4 milliGRAM(s) IV Push every 6 hours PRN Nausea  oxyCODONE    IR 5 milliGRAM(s) Oral every 3 hours PRN Moderate to Severe Pain (4-10)  oxyCODONE    IR 5 milliGRAM(s) Oral once PRN Moderate to Severe Pain (4-10)  simethicone 80 milliGRAM(s) Chew every 4 hours PRN Gas      OBJECTIVE:    Sedation:        	[X] Alert	 [ ] Drowsy	[ ] Arousable      [ ] Asleep       [ ] Unresponsive    Side Effects:	[X] None 	[ ] Nausea	[ ] Vomiting         [ ] Pruritus  		[ ] Weakness            [ ] Numbness	          [ ] Other:    Vital Signs Last 24 Hrs  T(C): 36.8 (10 May 2022 09:38), Max: 36.9 (09 May 2022 19:42)  T(F): 98.3 (10 May 2022 09:38), Max: 98.4 (09 May 2022 19:42)  HR: 89 (10 May 2022 09:38) (74 - 96)  BP: 106/71 (10 May 2022 09:38) (95/61 - 135/81)  BP(mean): 89 (09 May 2022 16:00) (81 - 101)  RR: 18 (10 May 2022 09:38) (15 - 25)  SpO2: 98% (10 May 2022 09:38) (97% - 100%)    ASSESSMENT/ PLAN  [X] Patient transitioned to prn analgesics  [X] Pain management per primary service, pain service to sign off   [X]Documentation and Verification of current medications

## 2022-05-10 NOTE — DISCHARGE NOTE OB - MEDICATION SUMMARY - MEDICATIONS TO TAKE
I will START or STAY ON the medications listed below when I get home from the hospital:    DHA  -- 1  by mouth 2 times a day  -- Indication: For supplement    acetaminophen 325 mg oral tablet  -- 3 tab(s) by mouth every 6 hours; donot exceed 4000 mg/day  -- Indication: For Pain    ibuprofen 600 mg oral tablet  -- 1 tab(s) by mouth every 6 hours  -- Indication: For Pain    aspirin 81 mg oral delayed release tablet  -- 1 tab(s) by mouth once a day alternate with 2 tabs  -- Indication: For aspirin    magnesium hydroxide 8% oral suspension  -- 30 milliliter(s) by mouth 2 times a day, As needed, Constipation  -- Indication: For supplement    ZyrTEC 10 mg oral tablet  -- 1 tab(s) by mouth once a day pm  -- Indication: For allergies    lanolin topical ointment  -- 1 application on skin every 6 hours, As needed, Sore Nipples  -- Indication: For sore nipples    Prenatal 1 oral capsule  -- Indication: For vitamin    magnesium citrate  -- 1  by mouth once a day  -- Indication: For supplement    Colace  -- 1  by mouth 3 times a day  -- Indication: For stool softner    Fiberall Tablets  -- 2  by mouth once a day  -- Indication: For fiber    Probiotic Formula oral capsule  -- 1 cap(s) by mouth once a day  -- Indication: For Probiotics    Vitamin D3 2000 intl units oral tablet  -- 2 tab(s) by mouth once a day  -- Indication: For vitamin    Vitamin C 500 mg oral tablet  -- 1 tab(s) by mouth once a day  -- Indication: For vitamin

## 2022-05-10 NOTE — DISCHARGE NOTE OB - CARE PLAN
1 Principal Discharge DX:	37 weeks gestation of pregnancy  Assessment and plan of treatment:	elective C/S  Secondary Diagnosis:	H/O: myomectomy  Secondary Diagnosis:	Breech presentation  Secondary Diagnosis:	Severe obesity (BMI >= 40)   Principal Discharge DX:	37 weeks gestation of pregnancy  Assessment and plan of treatment:	elective C/S  Secondary Diagnosis:	H/O: myomectomy  Assessment and plan of treatment:	stable and intact  Secondary Diagnosis:	Breech presentation  Assessment and plan of treatment:	delivered  Secondary Diagnosis:	Severe obesity (BMI >= 40)  Assessment and plan of treatment:	stable  Secondary Diagnosis:	SBO (small bowel obstruction)  Assessment and plan of treatment:	surgical consult  NGT (bowel rest), NPO, d/c narcotics  fluid management  resolved  Secondary Diagnosis:	Bacteremia  Assessment and plan of treatment:	ID consult  IV antibiotics-cefepime  po Cipro for five more days  repeat blood cultures negative x 2

## 2022-05-10 NOTE — DISCHARGE NOTE OB - PLAN OF CARE
elective C/S delivered stable and intact surgical consult  NGT (bowel rest), NPO, d/c narcotics  fluid management  resolved ID consult  IV antibiotics-cefepime  po Cipro for five more days  repeat blood cultures negative x 2 stable

## 2022-05-10 NOTE — DISCHARGE NOTE OB - CARE PROVIDER_API CALL
Jael Borja)  Obstetrics and Gynecology  [address] [place] [address]  Phone: [phone]  Fax: [phone]  Established Patient  Follow Up Time: 1 week   Jale Borja)  Obstetrics and Gynecology  [address] [place] [address]  Phone: [phone]  Fax: [phone]  Established Patient  Follow Up Time: 1-3 days

## 2022-05-10 NOTE — PROGRESS NOTE ADULT - SUBJECTIVE AND OBJECTIVE BOX
Section/Postpartum    KEY GARCIA is a  42y woman  , who is now post-operative day: 1    PAST MEDICAL & SURGICAL HISTORY:  ANA (iron deficiency anemia)    Low back pain    History of ovarian cyst    History of endometriosis    Obesity  BMI 42    History of cardiac murmur    Benign cyst of skin  removal at age 16 from temple    History of myomectomy  2018    History of appendectomy  2019    History of tubal ligation  Excision [id number]        Subjective:  The patient feels well.  She is ambulating.   She is tolerating regular diet.  She denies nausea and vomiting.  She is voiding.  Her pain is controlled.  She reports normal postpartum bleeding.  She is breastfeeding.  She is formula feeding.    Physical exam:    Vital Signs Last 24 Hrs  T(C): 36.8 (10 May 2022 09:38), Max: 36.9 (09 May 2022 19:42)  T(F): 98.3 (10 May 2022 09:38), Max: 98.4 (09 May 2022 19:42)  HR: 89 (10 May 2022 09:38) (74 - 96)  BP: 106/71 (10 May 2022 09:38) (95/61 - 135/81)  BP(mean): 89 (09 May 2022 16:00) (81 - 101)  RR: 18 (10 May 2022 09:38) (15 - 25)  SpO2: 98% (10 May 2022 09:38) (97% - 100%)    General: alert and oriented in no acute distress.  Breast: Soft, nontender, not engorged.  Abdomen: Soft, nontender, not distended ,  Uterine fundus is firm and at below the umbilicus, BS (+), Flatus (+), Bowel Movement (-)  Incision: Clean, dry, and intact, bandage has been removed  Pelvic: Normal lochia noted  Ext: No calf tenderness  Lochia: not excessive    LABS:                        10.1   11.14 )-----------( 165      ( 10 May 2022 06:39 )             30.3       Rubella status:  Immune    Blood Type:  ABO Interpretation: A   Rh Interpretation: Positive   Antibody Screen: Negative                     Allergies    amoxicillin (Other)    Intolerances        MEDICATIONS  (STANDING):  acetaminophen     Tablet .. 975 milliGRAM(s) Oral <User Schedule>  diphtheria/tetanus/pertussis (acellular) Vaccine (ADAcel) 0.5 milliLiter(s) IntraMuscular once  heparin   Injectable 54608 Unit(s) SubCutaneous every 12 hours  ibuprofen  Tablet. 600 milliGRAM(s) Oral every 6 hours  lactated ringers. 1000 milliLiter(s) (125 mL/Hr) IV Continuous <Continuous>  morphine PF Spinal 0.1 milliGRAM(s) IntraThecal. once  oxytocin Infusion 333.333 milliUNIT(s)/Min (1000 mL/Hr) IV Continuous <Continuous>  oxytocin Infusion 333.333 milliUNIT(s)/Min (1000 mL/Hr) IV Continuous <Continuous>    MEDICATIONS  (PRN):  dexAMETHasone  Injectable 4 milliGRAM(s) IV Push every 6 hours PRN Nausea  diphenhydrAMINE 25 milliGRAM(s) Oral every 6 hours PRN Pruritus  diphenhydrAMINE 25 milliGRAM(s) Oral every 4 hours PRN Rash and/or Itching  lanolin Ointment 1 Application(s) Topical every 6 hours PRN Sore Nipples  magnesium hydroxide Suspension 30 milliLiter(s) Oral two times a day PRN Constipation  naloxone Injectable 0.1 milliGRAM(s) IV Push every 3 minutes PRN For ANY of the following changes in patient status:  A. Breaths Per Minute LESS THAN 10, B. Oxygen saturation LESS THAN 90%, C. Sedation score of 6 for Stop After: 4 Times  ondansetron Injectable 4 milliGRAM(s) IV Push every 6 hours PRN Nausea  oxyCODONE    IR 5 milliGRAM(s) Oral every 3 hours PRN Moderate to Severe Pain (4-10)  oxyCODONE    IR 5 milliGRAM(s) Oral once PRN Moderate to Severe Pain (4-10)  simethicone 80 milliGRAM(s) Chew every 4 hours PRN Gas        Assessment and Plan:    POD # 1 s/p PCS        Doing well.  Encourage ambulation, may shower, routine postop care.

## 2022-05-10 NOTE — DISCHARGE NOTE OB - PATIENT PORTAL LINK FT
You can access the FollowMyHealth Patient Portal offered by [facility] by registering at the following website: [email or web address]. By joining [facility]’s FollowMyHealth portal, you will also be able to view your health information using other applications (apps) compatible with our system.

## 2022-05-10 NOTE — DISCHARGE NOTE OB - HOSPITAL COURSE
43 y/o admitted at 37 weeks gestation admitted for elective C/S because of previous Myomectomy. Prenatal course is significant for IVF but otherwise benign course. She was delivered via mid-transverse C/S. Viable baby boy was delivered via breech presentation with apgars 8/9 and wt 6' 15". Both mother and infant are doing well. 43 y/o admitted at 37 weeks gestation admitted for elective C/S because of previous Myomectomy. Prenatal course is significant for IVF but otherwise benign course. She was delivered via mid-transverse C/S. Viable baby boy was delivered via breech presentation with apgars 8/9 and wt 6' 15". On postop day 1, pt developed fever and abdominal epigastric pain. W/U was performed and she was started on cleocin and gentamycin. ID was consulted and antibiotics were changed to Cefepime IV. Blood cultures came back with Klebsiella and enterobacter, sensitive to Cefepime. She had good response and became afebrile and repeat blood cultures were negative as were tests for Covid and RVP.  She was diagnosed with SBO and placed on bowel rest with an NGT. This gradually resolve with return to bowel functions.

## 2022-05-10 NOTE — DISCHARGE NOTE OB - NS MD DC FALL RISK RISK
For information on Fall & Injury Prevention, visit: [email or web address] OR  [email or web address] OR  https://www.cdc.gov/steadi/patient.html

## 2022-05-10 NOTE — DISCHARGE NOTE OB - SECONDARY DIAGNOSIS.
Breech presentation H/O: myomectomy Severe obesity (BMI >= 40) SBO (small bowel obstruction) Bacteremia

## 2022-05-11 LAB
-  ENTEROBACTER (NON-CLOACAE COMPLEX): SIGNIFICANT CHANGE UP
ALBUMIN SERPL ELPH-MCNC: 2.6 G/DL — LOW (ref 3.3–5)
ALBUMIN SERPL ELPH-MCNC: 2.8 G/DL — LOW (ref 3.3–5)
ALP SERPL-CCNC: 143 U/L — HIGH (ref 40–120)
ALP SERPL-CCNC: 159 U/L — HIGH (ref 40–120)
ALT FLD-CCNC: 8 U/L — LOW (ref 10–45)
ALT FLD-CCNC: 8 U/L — LOW (ref 10–45)
ANION GAP SERPL CALC-SCNC: 10 MMOL/L — SIGNIFICANT CHANGE UP (ref 5–17)
ANION GAP SERPL CALC-SCNC: 12 MMOL/L — SIGNIFICANT CHANGE UP (ref 5–17)
APPEARANCE UR: CLEAR — SIGNIFICANT CHANGE UP
APTT BLD: 29.1 SEC — SIGNIFICANT CHANGE UP (ref 27.5–35.5)
AST SERPL-CCNC: 17 U/L — SIGNIFICANT CHANGE UP (ref 10–40)
AST SERPL-CCNC: 18 U/L — SIGNIFICANT CHANGE UP (ref 10–40)
BASOPHILS # BLD AUTO: 0 K/UL — SIGNIFICANT CHANGE UP (ref 0–0.2)
BASOPHILS # BLD AUTO: 0 K/UL — SIGNIFICANT CHANGE UP (ref 0–0.2)
BASOPHILS # BLD AUTO: 0.03 K/UL — SIGNIFICANT CHANGE UP (ref 0–0.2)
BASOPHILS NFR BLD AUTO: 0 % — SIGNIFICANT CHANGE UP (ref 0–2)
BASOPHILS NFR BLD AUTO: 0 % — SIGNIFICANT CHANGE UP (ref 0–2)
BASOPHILS NFR BLD AUTO: 0.2 % — SIGNIFICANT CHANGE UP (ref 0–2)
BILIRUB DIRECT SERPL-MCNC: 0.1 MG/DL — SIGNIFICANT CHANGE UP (ref 0–0.3)
BILIRUB INDIRECT FLD-MCNC: 0.4 MG/DL — SIGNIFICANT CHANGE UP (ref 0.2–1)
BILIRUB SERPL-MCNC: 0.4 MG/DL — SIGNIFICANT CHANGE UP (ref 0.2–1.2)
BILIRUB SERPL-MCNC: 0.5 MG/DL — SIGNIFICANT CHANGE UP (ref 0.2–1.2)
BILIRUB SERPL-MCNC: 0.5 MG/DL — SIGNIFICANT CHANGE UP (ref 0.2–1.2)
BILIRUB UR-MCNC: NEGATIVE — SIGNIFICANT CHANGE UP
BUN SERPL-MCNC: 10 MG/DL — SIGNIFICANT CHANGE UP (ref 7–23)
BUN SERPL-MCNC: 10 MG/DL — SIGNIFICANT CHANGE UP (ref 7–23)
CALCIUM SERPL-MCNC: 8.5 MG/DL — SIGNIFICANT CHANGE UP (ref 8.4–10.5)
CALCIUM SERPL-MCNC: 8.8 MG/DL — SIGNIFICANT CHANGE UP (ref 8.4–10.5)
CHLORIDE SERPL-SCNC: 105 MMOL/L — SIGNIFICANT CHANGE UP (ref 96–108)
CHLORIDE SERPL-SCNC: 107 MMOL/L — SIGNIFICANT CHANGE UP (ref 96–108)
CO2 SERPL-SCNC: 19 MMOL/L — LOW (ref 22–31)
CO2 SERPL-SCNC: 19 MMOL/L — LOW (ref 22–31)
COLOR SPEC: SIGNIFICANT CHANGE UP
COVID-19 SPIKE DOMAIN AB INTERP: POSITIVE
COVID-19 SPIKE DOMAIN ANTIBODY RESULT: >250 U/ML — HIGH
CREAT SERPL-MCNC: 0.55 MG/DL — SIGNIFICANT CHANGE UP (ref 0.5–1.3)
CREAT SERPL-MCNC: 0.58 MG/DL — SIGNIFICANT CHANGE UP (ref 0.5–1.3)
CULTURE RESULTS: NO GROWTH — SIGNIFICANT CHANGE UP
DIFF PNL FLD: NEGATIVE — SIGNIFICANT CHANGE UP
EGFR: 117 ML/MIN/1.73M2 — SIGNIFICANT CHANGE UP
EGFR: 118 ML/MIN/1.73M2 — SIGNIFICANT CHANGE UP
EOSINOPHIL # BLD AUTO: 0 K/UL — SIGNIFICANT CHANGE UP (ref 0–0.5)
EOSINOPHIL # BLD AUTO: 0.01 K/UL — SIGNIFICANT CHANGE UP (ref 0–0.5)
EOSINOPHIL # BLD AUTO: 0.17 K/UL — SIGNIFICANT CHANGE UP (ref 0–0.5)
EOSINOPHIL NFR BLD AUTO: 0 % — SIGNIFICANT CHANGE UP (ref 0–6)
EOSINOPHIL NFR BLD AUTO: 0.1 % — SIGNIFICANT CHANGE UP (ref 0–6)
EOSINOPHIL NFR BLD AUTO: 0.9 % — SIGNIFICANT CHANGE UP (ref 0–6)
GIANT PLATELETS BLD QL SMEAR: PRESENT — SIGNIFICANT CHANGE UP
GLUCOSE SERPL-MCNC: 104 MG/DL — HIGH (ref 70–99)
GLUCOSE SERPL-MCNC: 114 MG/DL — HIGH (ref 70–99)
GLUCOSE UR QL: NEGATIVE — SIGNIFICANT CHANGE UP
GRAM STN FLD: SIGNIFICANT CHANGE UP
GRAM STN FLD: SIGNIFICANT CHANGE UP
HCT VFR BLD CALC: 32.9 % — LOW (ref 34.5–45)
HCT VFR BLD CALC: 34.2 % — LOW (ref 34.5–45)
HCT VFR BLD CALC: 34.8 % — SIGNIFICANT CHANGE UP (ref 34.5–45)
HGB BLD-MCNC: 11.2 G/DL — LOW (ref 11.5–15.5)
HGB BLD-MCNC: 11.6 G/DL — SIGNIFICANT CHANGE UP (ref 11.5–15.5)
HGB BLD-MCNC: 11.8 G/DL — SIGNIFICANT CHANGE UP (ref 11.5–15.5)
IMM GRANULOCYTES NFR BLD AUTO: 0.8 % — SIGNIFICANT CHANGE UP (ref 0–1.5)
INR BLD: 1.07 RATIO — SIGNIFICANT CHANGE UP (ref 0.88–1.16)
KETONES UR-MCNC: ABNORMAL
LACTATE SERPL-SCNC: 1.2 MMOL/L — SIGNIFICANT CHANGE UP (ref 0.7–2)
LACTATE SERPL-SCNC: 1.6 MMOL/L — SIGNIFICANT CHANGE UP (ref 0.7–2)
LEUKOCYTE ESTERASE UR-ACNC: NEGATIVE — SIGNIFICANT CHANGE UP
LYMPHOCYTES # BLD AUTO: 0.32 K/UL — LOW (ref 1–3.3)
LYMPHOCYTES # BLD AUTO: 0.43 K/UL — LOW (ref 1–3.3)
LYMPHOCYTES # BLD AUTO: 0.59 K/UL — LOW (ref 1–3.3)
LYMPHOCYTES # BLD AUTO: 1.7 % — LOW (ref 13–44)
LYMPHOCYTES # BLD AUTO: 2.6 % — LOW (ref 13–44)
LYMPHOCYTES # BLD AUTO: 3 % — LOW (ref 13–44)
MANUAL SMEAR VERIFICATION: SIGNIFICANT CHANGE UP
MCHC RBC-ENTMCNC: 31.9 PG — SIGNIFICANT CHANGE UP (ref 27–34)
MCHC RBC-ENTMCNC: 32.1 PG — SIGNIFICANT CHANGE UP (ref 27–34)
MCHC RBC-ENTMCNC: 32.4 PG — SIGNIFICANT CHANGE UP (ref 27–34)
MCHC RBC-ENTMCNC: 33.9 GM/DL — SIGNIFICANT CHANGE UP (ref 32–36)
MCHC RBC-ENTMCNC: 33.9 GM/DL — SIGNIFICANT CHANGE UP (ref 32–36)
MCHC RBC-ENTMCNC: 34 GM/DL — SIGNIFICANT CHANGE UP (ref 32–36)
MCV RBC AUTO: 94.1 FL — SIGNIFICANT CHANGE UP (ref 80–100)
MCV RBC AUTO: 94.7 FL — SIGNIFICANT CHANGE UP (ref 80–100)
MCV RBC AUTO: 95.1 FL — SIGNIFICANT CHANGE UP (ref 80–100)
METHOD TYPE: SIGNIFICANT CHANGE UP
MONOCYTES # BLD AUTO: 0.32 K/UL — SIGNIFICANT CHANGE UP (ref 0–0.9)
MONOCYTES # BLD AUTO: 0.71 K/UL — SIGNIFICANT CHANGE UP (ref 0–0.9)
MONOCYTES # BLD AUTO: 0.88 K/UL — SIGNIFICANT CHANGE UP (ref 0–0.9)
MONOCYTES NFR BLD AUTO: 1.7 % — LOW (ref 2–14)
MONOCYTES NFR BLD AUTO: 4.3 % — SIGNIFICANT CHANGE UP (ref 2–14)
MONOCYTES NFR BLD AUTO: 4.4 % — SIGNIFICANT CHANGE UP (ref 2–14)
MYELOCYTES NFR BLD: 0.9 % — HIGH (ref 0–0)
NEUTROPHILS # BLD AUTO: 15.47 K/UL — HIGH (ref 1.8–7.4)
NEUTROPHILS # BLD AUTO: 17.81 K/UL — HIGH (ref 1.8–7.4)
NEUTROPHILS # BLD AUTO: 18.27 K/UL — HIGH (ref 1.8–7.4)
NEUTROPHILS NFR BLD AUTO: 84.4 % — HIGH (ref 43–77)
NEUTROPHILS NFR BLD AUTO: 87.8 % — HIGH (ref 43–77)
NEUTROPHILS NFR BLD AUTO: 91.5 % — HIGH (ref 43–77)
NEUTS BAND # BLD: 7 % — SIGNIFICANT CHANGE UP (ref 0–8)
NITRITE UR-MCNC: NEGATIVE — SIGNIFICANT CHANGE UP
NRBC # BLD: 0 /100 WBCS — SIGNIFICANT CHANGE UP (ref 0–0)
PH UR: 6.5 — SIGNIFICANT CHANGE UP (ref 5–8)
PLAT MORPH BLD: NORMAL — SIGNIFICANT CHANGE UP
PLATELET # BLD AUTO: 159 K/UL — SIGNIFICANT CHANGE UP (ref 150–400)
PLATELET # BLD AUTO: 176 K/UL — SIGNIFICANT CHANGE UP (ref 150–400)
PLATELET # BLD AUTO: 195 K/UL — SIGNIFICANT CHANGE UP (ref 150–400)
POTASSIUM SERPL-MCNC: 3.9 MMOL/L — SIGNIFICANT CHANGE UP (ref 3.5–5.3)
POTASSIUM SERPL-MCNC: 4 MMOL/L — SIGNIFICANT CHANGE UP (ref 3.5–5.3)
POTASSIUM SERPL-SCNC: 3.9 MMOL/L — SIGNIFICANT CHANGE UP (ref 3.5–5.3)
POTASSIUM SERPL-SCNC: 4 MMOL/L — SIGNIFICANT CHANGE UP (ref 3.5–5.3)
PROT SERPL-MCNC: 5.3 G/DL — LOW (ref 6–8.3)
PROT SERPL-MCNC: 5.5 G/DL — LOW (ref 6–8.3)
PROT UR-MCNC: NEGATIVE — SIGNIFICANT CHANGE UP
PROTHROM AB SERPL-ACNC: 12.3 SEC — SIGNIFICANT CHANGE UP (ref 10.5–13.4)
RBC # BLD: 3.46 M/UL — LOW (ref 3.8–5.2)
RBC # BLD: 3.61 M/UL — LOW (ref 3.8–5.2)
RBC # BLD: 3.7 M/UL — LOW (ref 3.8–5.2)
RBC # FLD: 13.5 % — SIGNIFICANT CHANGE UP (ref 10.3–14.5)
RBC # FLD: 13.7 % — SIGNIFICANT CHANGE UP (ref 10.3–14.5)
RBC # FLD: 13.8 % — SIGNIFICANT CHANGE UP (ref 10.3–14.5)
RBC BLD AUTO: SIGNIFICANT CHANGE UP
SARS-COV-2 IGG+IGM SERPL QL IA: >250 U/ML — HIGH
SARS-COV-2 IGG+IGM SERPL QL IA: POSITIVE
SODIUM SERPL-SCNC: 136 MMOL/L — SIGNIFICANT CHANGE UP (ref 135–145)
SODIUM SERPL-SCNC: 136 MMOL/L — SIGNIFICANT CHANGE UP (ref 135–145)
SP GR SPEC: 1.01 — SIGNIFICANT CHANGE UP (ref 1.01–1.02)
SPECIMEN SOURCE: SIGNIFICANT CHANGE UP
UROBILINOGEN FLD QL: NEGATIVE — SIGNIFICANT CHANGE UP
WBC # BLD: 16.62 K/UL — HIGH (ref 3.8–10.5)
WBC # BLD: 18.79 K/UL — HIGH (ref 3.8–10.5)
WBC # BLD: 19.93 K/UL — HIGH (ref 3.8–10.5)
WBC # FLD AUTO: 16.62 K/UL — HIGH (ref 3.8–10.5)
WBC # FLD AUTO: 18.79 K/UL — HIGH (ref 3.8–10.5)
WBC # FLD AUTO: 19.93 K/UL — HIGH (ref 3.8–10.5)

## 2022-05-11 PROCEDURE — 71045 X-RAY EXAM CHEST 1 VIEW: CPT | Mod: 26

## 2022-05-11 PROCEDURE — 74018 RADEX ABDOMEN 1 VIEW: CPT | Mod: 26

## 2022-05-11 RX ORDER — KETOROLAC TROMETHAMINE 30 MG/ML
30 SYRINGE (ML) INJECTION EVERY 6 HOURS
Refills: 0 | Status: DISCONTINUED | OUTPATIENT
Start: 2022-05-11 | End: 2022-05-14

## 2022-05-11 RX ORDER — ONDANSETRON 8 MG/1
4 TABLET, FILM COATED ORAL ONCE
Refills: 0 | Status: COMPLETED | OUTPATIENT
Start: 2022-05-11 | End: 2022-05-11

## 2022-05-11 RX ORDER — MAGNESIUM HYDROXIDE 400 MG/1
30 TABLET, CHEWABLE ORAL DAILY
Refills: 0 | Status: DISCONTINUED | OUTPATIENT
Start: 2022-05-11 | End: 2022-05-12

## 2022-05-11 RX ORDER — MORPHINE SULFATE 50 MG/1
4 CAPSULE, EXTENDED RELEASE ORAL ONCE
Refills: 0 | Status: DISCONTINUED | OUTPATIENT
Start: 2022-05-11 | End: 2022-05-11

## 2022-05-11 RX ORDER — SENNA PLUS 8.6 MG/1
1 TABLET ORAL ONCE
Refills: 0 | Status: COMPLETED | OUTPATIENT
Start: 2022-05-11 | End: 2022-05-11

## 2022-05-11 RX ORDER — ACETAMINOPHEN 500 MG
1000 TABLET ORAL ONCE
Refills: 0 | Status: COMPLETED | OUTPATIENT
Start: 2022-05-11 | End: 2022-05-11

## 2022-05-11 RX ADMIN — Medication 600 MILLIGRAM(S): at 17:29

## 2022-05-11 RX ADMIN — HEPARIN SODIUM 10000 UNIT(S): 5000 INJECTION INTRAVENOUS; SUBCUTANEOUS at 06:30

## 2022-05-11 RX ADMIN — OXYCODONE HYDROCHLORIDE 5 MILLIGRAM(S): 5 TABLET ORAL at 09:45

## 2022-05-11 RX ADMIN — Medication 600 MILLIGRAM(S): at 06:34

## 2022-05-11 RX ADMIN — Medication 975 MILLIGRAM(S): at 15:09

## 2022-05-11 RX ADMIN — OXYCODONE HYDROCHLORIDE 5 MILLIGRAM(S): 5 TABLET ORAL at 04:59

## 2022-05-11 RX ADMIN — Medication 1000 MILLIGRAM(S): at 22:00

## 2022-05-11 RX ADMIN — OXYCODONE HYDROCHLORIDE 5 MILLIGRAM(S): 5 TABLET ORAL at 04:34

## 2022-05-11 RX ADMIN — OXYCODONE HYDROCHLORIDE 5 MILLIGRAM(S): 5 TABLET ORAL at 01:04

## 2022-05-11 RX ADMIN — SENNA PLUS 1 TABLET(S): 8.6 TABLET ORAL at 16:51

## 2022-05-11 RX ADMIN — OXYCODONE HYDROCHLORIDE 5 MILLIGRAM(S): 5 TABLET ORAL at 13:09

## 2022-05-11 RX ADMIN — Medication 600 MILLIGRAM(S): at 11:40

## 2022-05-11 RX ADMIN — Medication 300 MILLIGRAM(S): at 01:31

## 2022-05-11 RX ADMIN — HEPARIN SODIUM 10000 UNIT(S): 5000 INJECTION INTRAVENOUS; SUBCUTANEOUS at 17:29

## 2022-05-11 RX ADMIN — Medication 975 MILLIGRAM(S): at 15:30

## 2022-05-11 RX ADMIN — Medication 400 MILLIGRAM(S): at 21:34

## 2022-05-11 RX ADMIN — SIMETHICONE 80 MILLIGRAM(S): 80 TABLET, CHEWABLE ORAL at 15:07

## 2022-05-11 RX ADMIN — Medication 30 MILLIGRAM(S): at 01:00

## 2022-05-11 RX ADMIN — ONDANSETRON 4 MILLIGRAM(S): 8 TABLET, FILM COATED ORAL at 22:50

## 2022-05-11 RX ADMIN — SIMETHICONE 80 MILLIGRAM(S): 80 TABLET, CHEWABLE ORAL at 06:03

## 2022-05-11 RX ADMIN — SIMETHICONE 80 MILLIGRAM(S): 80 TABLET, CHEWABLE ORAL at 01:04

## 2022-05-11 RX ADMIN — Medication 1000 MILLIGRAM(S): at 03:45

## 2022-05-11 RX ADMIN — Medication 30 MILLIGRAM(S): at 00:01

## 2022-05-11 RX ADMIN — Medication 100 MILLIGRAM(S): at 16:51

## 2022-05-11 RX ADMIN — Medication 400 MILLIGRAM(S): at 03:14

## 2022-05-11 RX ADMIN — Medication 600 MILLIGRAM(S): at 12:45

## 2022-05-11 RX ADMIN — Medication 975 MILLIGRAM(S): at 09:45

## 2022-05-11 RX ADMIN — Medication 100 MILLIGRAM(S): at 23:21

## 2022-05-11 RX ADMIN — Medication 100 MILLIGRAM(S): at 09:02

## 2022-05-11 RX ADMIN — OXYCODONE HYDROCHLORIDE 5 MILLIGRAM(S): 5 TABLET ORAL at 13:15

## 2022-05-11 RX ADMIN — Medication 975 MILLIGRAM(S): at 09:02

## 2022-05-11 RX ADMIN — OXYCODONE HYDROCHLORIDE 5 MILLIGRAM(S): 5 TABLET ORAL at 02:30

## 2022-05-11 RX ADMIN — Medication 100 MILLIGRAM(S): at 00:22

## 2022-05-11 RX ADMIN — SODIUM CHLORIDE 1000 MILLILITER(S): 9 INJECTION, SOLUTION INTRAVENOUS at 00:50

## 2022-05-11 RX ADMIN — OXYCODONE HYDROCHLORIDE 5 MILLIGRAM(S): 5 TABLET ORAL at 09:02

## 2022-05-11 RX ADMIN — OXYCODONE HYDROCHLORIDE 5 MILLIGRAM(S): 5 TABLET ORAL at 00:00

## 2022-05-11 RX ADMIN — SODIUM CHLORIDE 1000 MILLILITER(S): 9 INJECTION, SOLUTION INTRAVENOUS at 03:00

## 2022-05-11 NOTE — PROGRESS NOTE ADULT - SUBJECTIVE AND OBJECTIVE BOX
Patient seen and examined at bedside, no acute overnight events. No acute complaints, pain medication helping but still experiencing lower abdominal pain. Patient is ambulating and small parts of tolerating regular diet. Has not yet passed flatus. Is voiding spontaneously ambulating to bathroom without difficulty. Denies CP, SOB, N/V, HA, blurred vision, epigastric pain.    Vital Signs Last 24 Hours  T(C): 37.9 (05-11-22 @ 06:10), Max: 38.8 (05-10-22 @ 23:40)  HR: 116 (05-11-22 @ 06:10) (89 - 138)  BP: 110/70 (05-11-22 @ 06:10) (106/71 - 123/76)  RR: 20 (05-11-22 @ 06:10) (18 - 26)  SpO2: 98% (05-11-22 @ 06:10) (96% - 99%)    I&O's Summary    10 May 2022 07:01  -  11 May 2022 07:00  --------------------------------------------------------  IN: 3000 mL / OUT: 1350 mL / NET: 1650 mL        Physical Exam:  General: NAD  Abdomen: Soft, tender to palpation diffusely across lower abdomen, non-distended, fundus firm  Incision: midline incision w/ staples C/D/I, subcuticular suture closure  Pelvic: Lochia wnl, external exam of perineum clean and dry without swelling    Labs:    Blood Type: A Positive  Antibody Screen: Negative  RPR: Negative               11.2   16.62 )-----------( 159      ( 05-11 @ 00:27 )             32.9                10.1   11.14 )-----------( 165      ( 05-10 @ 06:39 )             30.3                12.6   7.39  )-----------( 190      ( 05-09 @ 07:57 )             38.7         MEDICATIONS  (STANDING):  acetaminophen     Tablet .. 975 milliGRAM(s) Oral <User Schedule>  clindamycin IVPB      clindamycin IVPB 900 milliGRAM(s) IV Intermittent every 8 hours  diphtheria/tetanus/pertussis (acellular) Vaccine (ADAcel) 0.5 milliLiter(s) IntraMuscular once  heparin   Injectable 82708 Unit(s) SubCutaneous every 12 hours  ibuprofen  Tablet. 600 milliGRAM(s) Oral every 6 hours  lactated ringers. 1000 milliLiter(s) (125 mL/Hr) IV Continuous <Continuous>  oxytocin Infusion 333.333 milliUNIT(s)/Min (1000 mL/Hr) IV Continuous <Continuous>  oxytocin Infusion 333.333 milliUNIT(s)/Min (1000 mL/Hr) IV Continuous <Continuous>    MEDICATIONS  (PRN):  diphenhydrAMINE 25 milliGRAM(s) Oral every 6 hours PRN Pruritus  diphenhydrAMINE 25 milliGRAM(s) Oral every 4 hours PRN Rash and/or Itching  lanolin Ointment 1 Application(s) Topical every 6 hours PRN Sore Nipples  magnesium hydroxide Suspension 30 milliLiter(s) Oral two times a day PRN Constipation  oxyCODONE    IR 5 milliGRAM(s) Oral once PRN Moderate to Severe Pain (4-10)  oxyCODONE    IR 5 milliGRAM(s) Oral every 3 hours PRN Moderate to Severe Pain (4-10)  simethicone 80 milliGRAM(s) Chew every 4 hours PRN Gas

## 2022-05-11 NOTE — CHART NOTE - NSCHARTNOTEFT_GEN_A_CORE
S: Pt having bilious vomiting with tenderness over abdomin, patient complaining of sharp cramping pain through her belly and constant discomfort. Pt reports belching and minimal flatus. +N/V HA, +SOB, no chest pain, no congestin, no diarrhea or dizziness on ambulation.    O: ICU Vital Signs Last 24 Hrs  T(C): 36.4 (11 May 2022 21:30), Max: 38.8 (10 May 2022 23:40)  T(F): 97.5 (11 May 2022 21:30), Max: 101.9 (10 May 2022 23:40)  HR: 114 (11 May 2022 21:30) (110 - 125)  BP: 115/80 (11 May 2022 21:30) (110/70 - 123/76)  RR: 20 (11 May 2022 21:30) (18 - 26)  SpO2: 100% (11 May 2022 21:30) (95% - 100%)    Gen: NAD  Cards: RRR  Pulm: CTAB  Abd: soft, tender in all quadrants, 10/10 pain on deep palpation  Ext: warm, well perfused    A/P: 41yo  POD1 pMTCS c/o abdominal pain for 1 day, now with bilious vomiting, r/o endometritis vs post-op ileus vs SBO vs billary tract pathology:    - Ordered for CBC, CMP, TBilli  - Portable CXR and abd xray pending.  - Urgent CTAP ordered and protocoled.  - NPO, LR@125, strict I&Os    Amyeo Afroz Jereen, PGY-1  dw Dr Borja and Dr Ellison S: Pt having bilious vomiting with tenderness over abdomin, patient complaining of sharp cramping pain through her belly and constant discomfort. Pt reports belching and minimal flatus. +N/V HA, +SOB, no chest pain, no congestin, no diarrhea or dizziness on ambulation.    O: ICU Vital Signs Last 24 Hrs  T(C): 36.4 (11 May 2022 21:30), Max: 38.8 (10 May 2022 23:40)  T(F): 97.5 (11 May 2022 21:30), Max: 101.9 (10 May 2022 23:40)  HR: 114 (11 May 2022 21:30) (110 - 125)  BP: 115/80 (11 May 2022 21:30) (110/70 - 123/76)  RR: 20 (11 May 2022 21:30) (18 - 26)  SpO2: 100% (11 May 2022 21:30) (95% - 100%)    Gen: NAD  Cards: RRR  Pulm: CTAB  Abd: soft, tender in all quadrants, 10/10 pain on deep palpation  Ext: warm, well perfused    A/P: 43yo  POD1 pMTCS c/o abdominal pain for 1 day, now with bilious vomiting, r/o endometritis vs post-op ileus vs SBO vs billary tract pathology:    - Ordered for CBC, CMP, TBilli  - Portable CXR and abd xray pending.  - Urgent CTAP ordered and protocoled.  - NPO, LR@125, strict I&Os  - c/w abx for endometritis given + BCx growing gram negative rods  - ID consulted for positive BCx    Amyeo Afroz Jereen, PGY-1  dw Dr Borja and Dr Ellison S: Pt having bilious vomiting with tenderness over abdomin, patient complaining of sharp cramping pain through her belly and constant discomfort. Pt reports belching and minimal flatus. +N/V HA, +SOB, no chest pain, no congestin, no diarrhea or dizziness on ambulation.    O: ICU Vital Signs Last 24 Hrs  T(C): 36.4 (11 May 2022 21:30), Max: 38.8 (10 May 2022 23:40)  T(F): 97.5 (11 May 2022 21:30), Max: 101.9 (10 May 2022 23:40)  HR: 114 (11 May 2022 21:30) (110 - 125)  BP: 115/80 (11 May 2022 21:30) (110/70 - 123/76)  RR: 20 (11 May 2022 21:30) (18 - 26)  SpO2: 100% (11 May 2022 21:30) (95% - 100%)    Gen: NAD  Cards: RRR  Pulm: CTAB  Abd: soft, tender in all quadrants, 10/10 pain on deep palpation  Ext: warm, well perfused    A/P: 41yo  POD2 pMTCS c/o abdominal pain for 1 day, now with bilious vomiting, r/o endometritis vs post-op ileus vs SBO vs billary tract pathology:    - Ordered for CBC, CMP, TBilli  - Portable CXR and abd xray pending.  - Urgent CTAP ordered and protocoled.  - NPO, LR@125, strict I&Os  - c/w abx for endometritis given + BCx growing gram negative rods  - ID consulted for positive BCx    Amyeo Afroz Jereen, PGY-1  dw Dr Borja and Dr Ellison

## 2022-05-11 NOTE — CHART NOTE - NSCHARTNOTEFT_GEN_A_CORE
R1 Eval Note    Informed by RN that patient was having increased abdominal pain, not relieved by pain medications.    Evaluated patient at bedside, stated that her abdominal pain had continued through this morning. Notes as diffuse upper abdominal pain across her whole abdomen, located above the umbilical cord. No pain relief from medications, coming in waves as "spasms" that feel musculoskeletal/"inside" the abdomen, unable to identify aggravating factors. Additionally notes occaisonal L neck shoulder/pain. Upon physical exam with both light and deep palpation, unable to elicit tenderness both above and below the umbilicus. Passed gas earlier in the morning and last night, now minimally eating solid foods. Tolerating tea, fruit, soup without nausea/vomiting.    Vital Signs  T(C): 36.7 (11 May 2022 12:39), Max: 38.8 (10 May 2022 23:40)  T(F): 98 (11 May 2022 12:39), Max: 101.9 (10 May 2022 23:40)  HR: 115 (11 May 2022 12:39) (108 - 138)  BP: 113/79 (11 May 2022 12:39) (107/61 - 123/76)  RR: 18 (11 May 2022 12:39) (18 - 26)  SpO2: 99% (11 May 2022 12:39) (95% - 99%)    Physical Exam  Gen: NAD  Cardio: regular rate and rhythm  Pulm: lungs clear to auscultation bilaterally  Abd: soft, non-tender to palpation, no fundal tenderness; minimal bowel sounds    - Likely gas pain due to location above umbilicus and lack of reproducible tenderness  - c/w Simethicone, start Senna, consider Milk of Magnesia    d/w PGY4 Dr Solange Koehler  PGY-1

## 2022-05-11 NOTE — PROVIDER CONTACT NOTE (CRITICAL VALUE NOTIFICATION) - ASSESSMENT
Pt reports abdominal pain.  Pt abdomen distended but soft. Pt hasn't had a temperature today. WBC count 19.93.

## 2022-05-11 NOTE — CHART NOTE - NSCHARTNOTEFT_GEN_A_CORE
ICU Vital Signs Last 24 Hrs  T(C): 36.8 (10 May 2022 21:05), Max: 38.3 (10 May 2022 21:00)  T(F): 98.3 (10 May 2022 21:05), Max: 100.9 (10 May 2022 21:00)  HR: 134 (10 May 2022 21:05) (82 - 138)  BP: 107/61 (10 May 2022 21:00) (95/61 - 120/68)  BP(mean): --  ABP: --  ABP(mean): --  RR: 18 (10 May 2022 21:00) (18 - 18)  SpO2: 97% (10 May 2022 21:00) (97% - 99%) 41yo female POD1 from uncomplicated pMTCS due to prior myomectomy. Pt started complaining of abd pain at 18:00, s/p toradol x1. Around 2100, patient had a similar complain and was found to have a fever for 38.3 oral, repeat was 36.8, afebrile and tachycardic to 138. RN was instructed to provide patient with IV tylenol and repeat VS.  I was reached on vocera around 23:00, with report that patient was tachycardic to 128, at which point RN was instructed to obtain rectal temperature and EKG.    S: Pt assessed at bedside at 2320. Patient denies SOB, N/V, diarrhea, is complaining of pain with movement and with deep respiration.   Pt states she has passed gas earlier today, but minimal. She feels bloated. Was able to ambulate to bathroom and urinating without difficulty.  Lochia minimal per pt.    O:  ICU Vital Signs Last 24 Hrs  T(C): 36.8 (10 May 2022 21:05), Max: 38.3 (10 May 2022 21:00)  T(F): 98.3 (10 May 2022 21:05), Max: 100.9 (10 May 2022 21:00)  HR: 134 (10 May 2022 21:05) (82 - 138)  BP: 107/61 (10 May 2022 21:00) (95/61 - 120/68)  RR: 18 (10 May 2022 21:00) (18 - 18)  SpO2: 97% (10 May 2022 21:00) (97% - 99%)      Gen: NAD, AOx3  Cards: RRR  Pulm: CTAB  Abd: soft, no tympanic, exquisitely tender in all quadrants, uterine tenderness 10/10 present.  Ext: Warm to touch, well perfused. Slightly diaphoretic. Pulses 2+ b/l, LE edema 1+ b/l and equal, no erythema      22 @ 07:01  -  05-10-22 @ 07:00  --------------------------------------------------------  IN:    Lactated Ringers: 500 mL    Oral Fluid: 360 mL  Total IN: 860 mL    OUT:    Indwelling Catheter - Urethral (mL): 1100 mL    Voided (mL): 300 mL  Total OUT: 1400 mL    Total NET: -540 mL      05-10-22 @ 07:01  -  22 @ 00:58  --------------------------------------------------------  IN:  Total IN: 0 mL    OUT:    Voided (mL): 750 mL  Total OUT: 750 mL    Total NET: -750 mL      22 (2d) Inpatient        EKG: sinus tachycardia         A/P: 41yo  POD1 pMTCS c/o abdominal pain likely 2/2 to endometritis:    - f/u BCx x2, UCx straight cath, lactate, CBC, CMP  - q3hr lactate if lactate elevated  - administer 1L LR bolus x3  - administer Toradol for pain control at MN for pain and fever control  - repeat VS q1hr until patient is afebrile  - Administer Gentamycin and Clindamycin for 24hr for infectious control  - Monitor for s/s of infection during hospital stay    Amyeo Afroz Jereen, PGY-1  Seen with Dr Ellison, Discussed with OB safety officers  d/w Dr Borja 43yo female POD1 from uncomplicated pMTCS due to prior myomectomy. Pt started complaining of abd pain at 18:00, s/p toradol x1. Around 2100, patient had a similar complain and was found to have a fever for 38.3 oral, repeat was 36.8, afebrile and tachycardic to 138. RN was instructed to provide patient with IV tylenol and repeat VS.  I was reached on vocera around 23:00, with report that patient was tachycardic to 128, at which point RN was instructed to obtain rectal temperature and EKG. Rectal temp 101.9 degree F and tachycardia to 128,     S: Pt assessed at bedside at 2320. Patient denies SOB, N/V, diarrhea, is complaining of pain with movement and with deep respiration.   Pt states she has passed gas earlier today, but minimal. She feels bloated. Was able to ambulate to bathroom and urinating without difficulty.  Lochia minimal per pt.    O:  ICU Vital Signs Last 24 Hrs  T(C): 36.8 (10 May 2022 21:05), Max: 38.3 (10 May 2022 21:00)  T(F): 98.3 (10 May 2022 21:05), Max: 100.9 (10 May 2022 21:00)  HR: 134 (10 May 2022 21:05) (82 - 138)  BP: 107/61 (10 May 2022 21:00) (95/61 - 120/68)  RR: 18 (10 May 2022 21:00) (18 - 18)  SpO2: 97% (10 May 2022 21:00) (97% - 99%)      Gen: NAD, AOx3  Cards: RRR  Pulm: CTAB  Abd: soft, no tympanic, exquisitely tender in all quadrants, uterine tenderness 10/10 present.  Ext: Warm to touch, well perfused. Slightly diaphoretic. Pulses 2+ b/l, LE edema 1+ b/l and equal, no erythema      22 @ 07:01  -  05-10-22 @ 07:00  --------------------------------------------------------  IN:    Lactated Ringers: 500 mL    Oral Fluid: 360 mL  Total IN: 860 mL    OUT:    Indwelling Catheter - Urethral (mL): 1100 mL    Voided (mL): 300 mL  Total OUT: 1400 mL    Total NET: -540 mL      05-10-22 @ 07:01  -  22 @ 00:58  --------------------------------------------------------  IN:  Total IN: 0 mL    OUT:    Voided (mL): 750 mL  Total OUT: 750 mL    Total NET: -750 mL      22 (2d) Inpatient        EKG: sinus tachycardia         A/P: 43yo  POD1 pMTCS c/o abdominal pain likely 2/2 to endometritis:    - f/u BCx x2, UCx straight cath, lactate, CBC, CMP  - q3hr lactate if lactate elevated  - administer 1L LR bolus x3  - administer Toradol for pain control at MN for pain and fever control  - repeat VS q1hr until patient is afebrile  - Administer Gentamycin and Clindamycin for 24hr for infectious control  - Monitor for s/s of infection during hospital stay    Amyeo Afroz Jereen, PGY-1  Seen with Dr Ellison, Discussed with OB safety officers  d/w Dr Borja 43yo female POD1 from uncomplicated pMTCS due to prior myomectomy. Pt started complaining of abd pain at 18:00, s/p toradol x1. Around 2100, patient had a similar complain and was found to have a fever for 38.3 oral, repeat was 36.8, afebrile and tachycardic to 138. RN was instructed to provide patient with IV tylenol and repeat VS.  I was reached on vocera around 23:00, with report that patient was tachycardic to 128, at which point RN was instructed to obtain rectal temperature and EKG. Rectal temp 101.9 degree F and tachycardia to 128 per RN, patient was assessed shortly after.    S: Pt assessed at bedside at 2320. Patient denies SOB, N/V, diarrhea, is complaining of pain with movement and with deep respiration.   Pt states she has passed gas earlier today, but minimal. She feels bloated. Was able to ambulate to bathroom and urinating without difficulty.  Lochia minimal per pt.    O:  ICU Vital Signs Last 24 Hrs  T(C): 36.8 (10 May 2022 21:05), Max: 38.3 (10 May 2022 21:00)  T(F): 98.3 (10 May 2022 21:05), Max: 100.9 (10 May 2022 21:00)  HR: 134 (10 May 2022 21:05) (82 - 138)  BP: 107/61 (10 May 2022 21:00) (95/61 - 120/68)  RR: 18 (10 May 2022 21:00) (18 - 18)  SpO2: 97% (10 May 2022 21:00) (97% - 99%)      Gen: NAD, AOx3  Cards: RRR  Pulm: CTAB  Abd: soft, no tympanic, exquisitely tender in all quadrants, uterine tenderness 10/10 present.  Ext: Warm to touch, well perfused. Slightly diaphoretic. Pulses 2+ b/l, LE edema 1+ b/l and equal, no erythema      22 @ 07:01  -  05-10-22 @ 07:00  --------------------------------------------------------  IN:    Lactated Ringers: 500 mL    Oral Fluid: 360 mL  Total IN: 860 mL    OUT:    Indwelling Catheter - Urethral (mL): 1100 mL    Voided (mL): 300 mL  Total OUT: 1400 mL    Total NET: -540 mL      05-10-22 @ 07:01  -  22 @ 00:58  --------------------------------------------------------  IN:  Total IN: 0 mL    OUT:    Voided (mL): 750 mL  Total OUT: 750 mL    Total NET: -750 mL      22 (2d) Inpatient        EKG: sinus tachycardia         A/P: 43yo  POD1 pMTCS c/o abdominal pain likely 2/2 to endometritis:    - f/u BCx x2, UCx straight cath, lactate, CBC, CMP  - q3hr lactate if lactate elevated  - administer 1L LR bolus x3  - administer Toradol for pain control at MN for pain and fever control  - repeat VS q1hr until patient is afebrile  - Administer Gentamycin and Clindamycin for 24hr for infectious control  - Monitor for s/s of infection during hospital stay    Amyeo Afroz Jereen, PGY-1  Seen with Dr Ellison, Discussed with OB safety officers  d/w Dr Borja 43yo female POD1 from uncomplicated pMTCS due to prior myomectomy. Pt started complaining of abd pain at 18:00, s/p toradol x1. Around 2100, patient had a similar complain and was found to have a fever for 38.3 oral, repeat was 36.8, afebrile and tachycardic to 138. RN was instructed to provide patient with IV tylenol and repeat VS.  I was reached on vocera around 23:00, with report that patient was tachycardic to 128, at which point RN was instructed to obtain rectal temperature and EKG. Rectal temp 101.9 degree F and tachycardia to 128 per RN, patient was assessed shortly after.    S: Pt assessed at bedside at 2320. Patient denies SOB, N/V, diarrhea, is complaining of pain with movement and with deep respiration.   Pt states she has passed gas earlier today, but minimal. She feels bloated. Was able to ambulate to bathroom and urinating without difficulty.  Lochia minimal per pt.    O:  ICU Vital Signs Last 24 Hrs  T(C): 36.8 (10 May 2022 21:05), Max: 38.3 (10 May 2022 21:00)  T(F): 98.3 (10 May 2022 21:05), Max: 100.9 (10 May 2022 21:00)  HR: 134 (10 May 2022 21:05) (82 - 138)  BP: 107/61 (10 May 2022 21:00) (95/61 - 120/68)  RR: 18 (10 May 2022 21:00) (18 - 18)  SpO2: 97% (10 May 2022 21:00) (97% - 99%)      Gen: NAD, AOx3  Cards: RRR  Pulm: CTAB  Abd: soft, no tympanic, exquisitely tender in all quadrants, uterine tenderness 10/10 present.  Ext: Warm to touch, well perfused. Slightly diaphoretic. Pulses 2+ b/l, LE edema 1+ b/l and equal, no erythema      22 @ 07:01  -  05-10-22 @ 07:00  --------------------------------------------------------  IN:    Lactated Ringers: 500 mL    Oral Fluid: 360 mL  Total IN: 860 mL    OUT:    Indwelling Catheter - Urethral (mL): 1100 mL    Voided (mL): 300 mL  Total OUT: 1400 mL    Total NET: -540 mL      05-10-22 @ 07:01  -  22 @ 00:58  --------------------------------------------------------  IN:  Total IN: 0 mL    OUT:    Voided (mL): 750 mL  Total OUT: 750 mL    Total NET: -750 mL      22 (2d) Inpatient        EKG: sinus tachycardia         A/P: 43yo  POD1 pMTCS c/o abdominal pain likely 2/2 to endometritis:    - f/u BCx x2, UCx straight cath, lactate, CBC, CMP  - q3hr lactate if lactate elevated  - administer 1L LR bolus x3  - administer Toradol for pain control at MN for pain and fever control  - repeat VS q1hr until patient is afebrile  - Administer Gentamycin and Clindamycin for 24hr for infectious control  - Monitor for s/s of infection during hospital stay  - cont simethicone for abd distension    Amyeo Afroz Jereen, PGY-1  Seen with Dr Ellison, Discussed with OB safety officers  d/w Dr Borja 41yo female POD1 from uncomplicated pMTCS due to prior myomectomy. Pt started complaining of abd pain at 18:00, s/p toradol x1. Around 2100, patient had a similar complain and was found to have a fever for 38.3 oral, repeat was 36.8, afebrile and tachycardic to 138. RN was instructed to provide patient with IV tylenol and repeat VS.  I was reached on vocera around 23:00, with report that patient was tachycardic to 128, at which point I instructed RN to obtain rectal temperature and EKG. Rectal temp 101.9 degree F and sinus tachycardia to 128 per RN, patient was assessed in person shortly after.    S: Pt assessed at bedside at 2320. Patient denies SOB, N/V, diarrhea, is complaining of pain with movement and with deep respiration.   Pt states she has passed gas earlier today, but minimal. She feels bloated. Was able to ambulate to bathroom and urinating without difficulty.  Lochia minimal per pt.    O:  ICU Vital Signs Last 24 Hrs  T(C): 36.8 (10 May 2022 21:05), Max: 38.3 (10 May 2022 21:00)  T(F): 98.3 (10 May 2022 21:05), Max: 100.9 (10 May 2022 21:00)  HR: 134 (10 May 2022 21:05) (82 - 138)  BP: 107/61 (10 May 2022 21:00) (95/61 - 120/68)  RR: 18 (10 May 2022 21:00) (18 - 18)  SpO2: 97% (10 May 2022 21:00) (97% - 99%)      Gen: NAD, AOx3  Cards: RRR  Pulm: CTAB  Abd: soft, no tympanic, exquisitely tender in all quadrants, uterine tenderness 10/10 present.  Ext: Warm to touch, well perfused. Slightly diaphoretic. Pulses 2+ b/l, LE edema 1+ b/l and equal, no erythema      22 @ 07:01  -  05-10-22 @ 07:00  --------------------------------------------------------  IN:    Lactated Ringers: 500 mL    Oral Fluid: 360 mL  Total IN: 860 mL    OUT:    Indwelling Catheter - Urethral (mL): 1100 mL    Voided (mL): 300 mL  Total OUT: 1400 mL    Total NET: -540 mL      05-10-22 @ 07:01  -  22 @ 00:58  --------------------------------------------------------  IN:  Total IN: 0 mL    OUT:    Voided (mL): 750 mL  Total OUT: 750 mL    Total NET: -750 mL      22 (2d) Inpatient        EKG: sinus tachycardia         A/P: 41yo  POD1 pMTCS c/o abdominal pain likely 2/2 to endometritis:    - f/u BCx x2, UCx straight cath, lactate, CBC, CMP  - q3hr lactate if lactate elevated  - administer 1L LR bolus x3  - administer Toradol for pain control at MN for pain and fever control  - repeat VS q1hr until patient is afebrile  - Administer Gentamycin and Clindamycin for 24hr for infectious control  - Monitor for s/s of infection during hospital stay  - cont simethicone for abd distension Amyeo Afroz Jereen, PGY-1  Seen with Dr Ellison, Discussed with OB safety officers  d/w Dr Borja

## 2022-05-11 NOTE — PROGRESS NOTE ADULT - SUBJECTIVE AND OBJECTIVE BOX
Section/Postpartum    KEY GARCIA is a  42y woman  , who is now post-operative day: 2    PAST MEDICAL & SURGICAL HISTORY:  ANA (iron deficiency anemia)      Low back pain      History of ovarian cyst      History of endometriosis      Obesity  BMI 42      History of cardiac murmur      Benign cyst of skin  removal at age 16 from temple      History of myomectomy  2018      History of appendectomy  2019      History of tubal ligation  Excision [id number]          Subjective:  The patient is OOB sitting in a chair. She was noted to have fever of 101.9 last night. Fever workup was initiated and she was started on antibiotics-cleocin and gentamycin.  She is ambulating.   She is tolerating regular diet this am.  She denies nausea and vomiting.  She is voiding. Admits to passing gas but no BM.  Her pain is controlled with oxycodone. She had sever pain last night. Pain was relieved with the passing of gas.  She reports normal postpartum bleeding.  She is breastfeeding.  She is formula feeding.    Physical exam:    Vital Signs Last 24 Hrs  T(C): 37.9 (11 May 2022 06:10), Max: 38.8 (10 May 2022 23:40)  T(F): 100.2 (11 May 2022 06:10), Max: 101.9 (10 May 2022 23:40)  HR: 116 (11 May 2022 06:10) (98 - 138)  BP: 110/70 (11 May 2022 06:10) (107/61 - 123/76)  BP(mean): --  RR: 20 (11 May 2022 06:10) (18 - 26)  SpO2: 98% (11 May 2022 06:10) (96% - 99%)    General: alert and oriented in no acute distress.  Breast: Soft, nontender, not engorged.  Abdomen: Soft, tender to deep palpation, not distended ,  Uterine fundus is firm and at below the umbilicus, BS (+), Flatus (+), Bowel Movement (-)  Incision: Clean, dry, and intact, staples intact.  Pelvic: Normal lochia noted  Ext: No calf tenderness  Lochia: not excessive      LABS:                        11.6   19.93 )-----------( 176      ( 11 May 2022 07:12 )             34.2     `Lactate, Blood: 1.6 mmol/L     U/A: 2022  Glucose Qualitative, Urine: Negative   Blood, Urine: Negative   pH Urine: 6.5   Color: Light Yellow   Urine Appearance: Clear   Bilirubin: Negative   Ketone - Urine: Small   Specific Gravity: 1.013   Protein, Urine: Negative   Urobilinogen: Negative   Nitrite: Negative   Leukocyte Esterase Concentration: Negative     Rubella status:  Immune    Blood Type:  ABO Interpretation: A   Rh Interpretation: Positive   Antibody Screen: Negative                     Allergies    amoxicillin (Other)    Intolerances        MEDICATIONS  (STANDING):  acetaminophen     Tablet .. 975 milliGRAM(s) Oral <User Schedule>  clindamycin IVPB      clindamycin IVPB 900 milliGRAM(s) IV Intermittent every 8 hours  diphtheria/tetanus/pertussis (acellular) Vaccine (ADAcel) 0.5 milliLiter(s) IntraMuscular once  heparin   Injectable 35314 Unit(s) SubCutaneous every 12 hours  ibuprofen  Tablet. 600 milliGRAM(s) Oral every 6 hours  lactated ringers. 1000 milliLiter(s) (125 mL/Hr) IV Continuous <Continuous>  oxytocin Infusion 333.333 milliUNIT(s)/Min (1000 mL/Hr) IV Continuous <Continuous>  oxytocin Infusion 333.333 milliUNIT(s)/Min (1000 mL/Hr) IV Continuous <Continuous>    MEDICATIONS  (PRN):  diphenhydrAMINE 25 milliGRAM(s) Oral every 6 hours PRN Pruritus  diphenhydrAMINE 25 milliGRAM(s) Oral every 4 hours PRN Rash and/or Itching  lanolin Ointment 1 Application(s) Topical every 6 hours PRN Sore Nipples  magnesium hydroxide Suspension 30 milliLiter(s) Oral two times a day PRN Constipation  oxyCODONE    IR 5 milliGRAM(s) Oral once PRN Moderate to Severe Pain (4-10)  oxyCODONE    IR 5 milliGRAM(s) Oral every 3 hours PRN Moderate to Severe Pain (4-10)  simethicone 80 milliGRAM(s) Chew every 4 hours PRN Gas        Assessment and Plan:    POD #2   s/p  PCS       Improving. will continue with IV antibiotics Gent and Cleocin   Encourage ambulation, may shower, routine postop care.

## 2022-05-11 NOTE — PROGRESS NOTE ADULT - ASSESSMENT
41y/o  POD#2 from primary mid transverse  section for prior myomectomy . Patient is currently in stable condition.    #Endometritis  - PPT 38.3 5/10@2100; tachy to 130s  - WBC 16.62->19.93  - c/w Gentamicin/Clindamycin/Tylenol  - f/u AM CBC    #Routine Postpartum Care  - Continue with PO pain management  - Increase ambulation  - Continue regular diet  - POD#1 H/H 10.1/30.3 from 12.6/38.7, stable  - Incision dressing removed POD1    Lizzy Koehler  PGY-1

## 2022-05-12 DIAGNOSIS — K56.609 UNSPECIFIED INTESTINAL OBSTRUCTION, UNSPECIFIED AS TO PARTIAL VERSUS COMPLETE OBSTRUCTION: ICD-10-CM

## 2022-05-12 DIAGNOSIS — Z97.8 PRESENCE OF OTHER SPECIFIED DEVICES: ICD-10-CM

## 2022-05-12 DIAGNOSIS — R50.82 POSTPROCEDURAL FEVER: ICD-10-CM

## 2022-05-12 LAB
ALBUMIN SERPL ELPH-MCNC: 2.4 G/DL — LOW (ref 3.3–5)
ALBUMIN SERPL ELPH-MCNC: 2.8 G/DL — LOW (ref 3.3–5)
ALP SERPL-CCNC: 131 U/L — HIGH (ref 40–120)
ALP SERPL-CCNC: 149 U/L — HIGH (ref 40–120)
ALT FLD-CCNC: 7 U/L — LOW (ref 10–45)
ALT FLD-CCNC: 7 U/L — LOW (ref 10–45)
ANION GAP SERPL CALC-SCNC: 11 MMOL/L — SIGNIFICANT CHANGE UP (ref 5–17)
ANION GAP SERPL CALC-SCNC: 14 MMOL/L — SIGNIFICANT CHANGE UP (ref 5–17)
APTT BLD: 29.8 SEC — SIGNIFICANT CHANGE UP (ref 27.5–35.5)
AST SERPL-CCNC: 13 U/L — SIGNIFICANT CHANGE UP (ref 10–40)
AST SERPL-CCNC: 15 U/L — SIGNIFICANT CHANGE UP (ref 10–40)
BASOPHILS # BLD AUTO: 0.02 K/UL — SIGNIFICANT CHANGE UP (ref 0–0.2)
BASOPHILS # BLD AUTO: 0.03 K/UL — SIGNIFICANT CHANGE UP (ref 0–0.2)
BASOPHILS NFR BLD AUTO: 0.1 % — SIGNIFICANT CHANGE UP (ref 0–2)
BASOPHILS NFR BLD AUTO: 0.2 % — SIGNIFICANT CHANGE UP (ref 0–2)
BILIRUB SERPL-MCNC: 0.4 MG/DL — SIGNIFICANT CHANGE UP (ref 0.2–1.2)
BILIRUB SERPL-MCNC: 0.5 MG/DL — SIGNIFICANT CHANGE UP (ref 0.2–1.2)
BUN SERPL-MCNC: 12 MG/DL — SIGNIFICANT CHANGE UP (ref 7–23)
BUN SERPL-MCNC: 16 MG/DL — SIGNIFICANT CHANGE UP (ref 7–23)
CALCIUM SERPL-MCNC: 8.8 MG/DL — SIGNIFICANT CHANGE UP (ref 8.4–10.5)
CALCIUM SERPL-MCNC: 9 MG/DL — SIGNIFICANT CHANGE UP (ref 8.4–10.5)
CHLORIDE SERPL-SCNC: 103 MMOL/L — SIGNIFICANT CHANGE UP (ref 96–108)
CHLORIDE SERPL-SCNC: 104 MMOL/L — SIGNIFICANT CHANGE UP (ref 96–108)
CO2 SERPL-SCNC: 20 MMOL/L — LOW (ref 22–31)
CO2 SERPL-SCNC: 21 MMOL/L — LOW (ref 22–31)
CREAT SERPL-MCNC: 0.57 MG/DL — SIGNIFICANT CHANGE UP (ref 0.5–1.3)
CREAT SERPL-MCNC: 0.64 MG/DL — SIGNIFICANT CHANGE UP (ref 0.5–1.3)
EGFR: 114 ML/MIN/1.73M2 — SIGNIFICANT CHANGE UP
EGFR: 117 ML/MIN/1.73M2 — SIGNIFICANT CHANGE UP
EOSINOPHIL # BLD AUTO: 0 K/UL — SIGNIFICANT CHANGE UP (ref 0–0.5)
EOSINOPHIL # BLD AUTO: 0.02 K/UL — SIGNIFICANT CHANGE UP (ref 0–0.5)
EOSINOPHIL NFR BLD AUTO: 0 % — SIGNIFICANT CHANGE UP (ref 0–6)
EOSINOPHIL NFR BLD AUTO: 0.1 % — SIGNIFICANT CHANGE UP (ref 0–6)
GLUCOSE SERPL-MCNC: 111 MG/DL — HIGH (ref 70–99)
GLUCOSE SERPL-MCNC: 95 MG/DL — SIGNIFICANT CHANGE UP (ref 70–99)
GRAM STN FLD: SIGNIFICANT CHANGE UP
HCT VFR BLD CALC: 32 % — LOW (ref 34.5–45)
HCT VFR BLD CALC: 36.2 % — SIGNIFICANT CHANGE UP (ref 34.5–45)
HGB BLD-MCNC: 10.5 G/DL — LOW (ref 11.5–15.5)
HGB BLD-MCNC: 11.9 G/DL — SIGNIFICANT CHANGE UP (ref 11.5–15.5)
IMM GRANULOCYTES NFR BLD AUTO: 0.8 % — SIGNIFICANT CHANGE UP (ref 0–1.5)
IMM GRANULOCYTES NFR BLD AUTO: 1.1 % — SIGNIFICANT CHANGE UP (ref 0–1.5)
INR BLD: 1.31 RATIO — HIGH (ref 0.88–1.16)
LYMPHOCYTES # BLD AUTO: 0.45 K/UL — LOW (ref 1–3.3)
LYMPHOCYTES # BLD AUTO: 0.51 K/UL — LOW (ref 1–3.3)
LYMPHOCYTES # BLD AUTO: 2.5 % — LOW (ref 13–44)
LYMPHOCYTES # BLD AUTO: 3.5 % — LOW (ref 13–44)
MCHC RBC-ENTMCNC: 31.3 PG — SIGNIFICANT CHANGE UP (ref 27–34)
MCHC RBC-ENTMCNC: 31.6 PG — SIGNIFICANT CHANGE UP (ref 27–34)
MCHC RBC-ENTMCNC: 32.8 GM/DL — SIGNIFICANT CHANGE UP (ref 32–36)
MCHC RBC-ENTMCNC: 32.9 GM/DL — SIGNIFICANT CHANGE UP (ref 32–36)
MCV RBC AUTO: 95.2 FL — SIGNIFICANT CHANGE UP (ref 80–100)
MCV RBC AUTO: 96.3 FL — SIGNIFICANT CHANGE UP (ref 80–100)
MONOCYTES # BLD AUTO: 0.67 K/UL — SIGNIFICANT CHANGE UP (ref 0–0.9)
MONOCYTES # BLD AUTO: 0.79 K/UL — SIGNIFICANT CHANGE UP (ref 0–0.9)
MONOCYTES NFR BLD AUTO: 3.7 % — SIGNIFICANT CHANGE UP (ref 2–14)
MONOCYTES NFR BLD AUTO: 5.5 % — SIGNIFICANT CHANGE UP (ref 2–14)
NEUTROPHILS # BLD AUTO: 13.02 K/UL — HIGH (ref 1.8–7.4)
NEUTROPHILS # BLD AUTO: 16.72 K/UL — HIGH (ref 1.8–7.4)
NEUTROPHILS NFR BLD AUTO: 90 % — HIGH (ref 43–77)
NEUTROPHILS NFR BLD AUTO: 92.5 % — HIGH (ref 43–77)
NRBC # BLD: 0 /100 WBCS — SIGNIFICANT CHANGE UP (ref 0–0)
NRBC # BLD: 0 /100 WBCS — SIGNIFICANT CHANGE UP (ref 0–0)
PLATELET # BLD AUTO: 214 K/UL — SIGNIFICANT CHANGE UP (ref 150–400)
PLATELET # BLD AUTO: 225 K/UL — SIGNIFICANT CHANGE UP (ref 150–400)
POTASSIUM SERPL-MCNC: 3.8 MMOL/L — SIGNIFICANT CHANGE UP (ref 3.5–5.3)
POTASSIUM SERPL-MCNC: 4 MMOL/L — SIGNIFICANT CHANGE UP (ref 3.5–5.3)
POTASSIUM SERPL-SCNC: 3.8 MMOL/L — SIGNIFICANT CHANGE UP (ref 3.5–5.3)
POTASSIUM SERPL-SCNC: 4 MMOL/L — SIGNIFICANT CHANGE UP (ref 3.5–5.3)
PROT SERPL-MCNC: 5.3 G/DL — LOW (ref 6–8.3)
PROT SERPL-MCNC: 5.8 G/DL — LOW (ref 6–8.3)
PROTHROM AB SERPL-ACNC: 15.2 SEC — HIGH (ref 10.5–13.4)
RBC # BLD: 3.36 M/UL — LOW (ref 3.8–5.2)
RBC # BLD: 3.76 M/UL — LOW (ref 3.8–5.2)
RBC # FLD: 13.8 % — SIGNIFICANT CHANGE UP (ref 10.3–14.5)
RBC # FLD: 13.8 % — SIGNIFICANT CHANGE UP (ref 10.3–14.5)
SODIUM SERPL-SCNC: 135 MMOL/L — SIGNIFICANT CHANGE UP (ref 135–145)
SODIUM SERPL-SCNC: 138 MMOL/L — SIGNIFICANT CHANGE UP (ref 135–145)
WBC # BLD: 14.48 K/UL — HIGH (ref 3.8–10.5)
WBC # BLD: 18.06 K/UL — HIGH (ref 3.8–10.5)
WBC # FLD AUTO: 14.48 K/UL — HIGH (ref 3.8–10.5)
WBC # FLD AUTO: 18.06 K/UL — HIGH (ref 3.8–10.5)

## 2022-05-12 PROCEDURE — 74177 CT ABD & PELVIS W/CONTRAST: CPT | Mod: 26

## 2022-05-12 PROCEDURE — 99024 POSTOP FOLLOW-UP VISIT: CPT

## 2022-05-12 PROCEDURE — 99223 1ST HOSP IP/OBS HIGH 75: CPT

## 2022-05-12 PROCEDURE — 43752 NASAL/OROGASTRIC W/TUBE PLMT: CPT

## 2022-05-12 PROCEDURE — 71260 CT THORAX DX C+: CPT | Mod: 26

## 2022-05-12 PROCEDURE — 99221 1ST HOSP IP/OBS SF/LOW 40: CPT

## 2022-05-12 RX ORDER — MORPHINE SULFATE 50 MG/1
2 CAPSULE, EXTENDED RELEASE ORAL ONCE
Refills: 0 | Status: DISCONTINUED | OUTPATIENT
Start: 2022-05-12 | End: 2022-05-12

## 2022-05-12 RX ORDER — CEFEPIME 1 G/1
2000 INJECTION, POWDER, FOR SOLUTION INTRAMUSCULAR; INTRAVENOUS EVERY 8 HOURS
Refills: 0 | Status: DISCONTINUED | OUTPATIENT
Start: 2022-05-12 | End: 2022-05-15

## 2022-05-12 RX ORDER — ACETAMINOPHEN 500 MG
1000 TABLET ORAL EVERY 6 HOURS
Refills: 0 | Status: COMPLETED | OUTPATIENT
Start: 2022-05-12 | End: 2022-05-12

## 2022-05-12 RX ORDER — FAMOTIDINE 10 MG/ML
20 INJECTION INTRAVENOUS ONCE
Refills: 0 | Status: DISCONTINUED | OUTPATIENT
Start: 2022-05-12 | End: 2022-05-16

## 2022-05-12 RX ORDER — ACETAMINOPHEN 500 MG
1000 TABLET ORAL ONCE
Refills: 0 | Status: COMPLETED | OUTPATIENT
Start: 2022-05-12 | End: 2022-05-12

## 2022-05-12 RX ORDER — MORPHINE SULFATE 50 MG/1
2 CAPSULE, EXTENDED RELEASE ORAL EVERY 4 HOURS
Refills: 0 | Status: DISCONTINUED | OUTPATIENT
Start: 2022-05-12 | End: 2022-05-16

## 2022-05-12 RX ORDER — ONDANSETRON 8 MG/1
4 TABLET, FILM COATED ORAL EVERY 8 HOURS
Refills: 0 | Status: DISCONTINUED | OUTPATIENT
Start: 2022-05-12 | End: 2022-05-16

## 2022-05-12 RX ORDER — ACETAMINOPHEN 500 MG
1000 TABLET ORAL ONCE
Refills: 0 | Status: COMPLETED | OUTPATIENT
Start: 2022-05-13 | End: 2022-05-13

## 2022-05-12 RX ORDER — SODIUM CHLORIDE 9 MG/ML
1000 INJECTION, SOLUTION INTRAVENOUS ONCE
Refills: 0 | Status: COMPLETED | OUTPATIENT
Start: 2022-05-12 | End: 2022-05-12

## 2022-05-12 RX ORDER — ONDANSETRON 8 MG/1
4 TABLET, FILM COATED ORAL EVERY 8 HOURS
Refills: 0 | Status: DISCONTINUED | OUTPATIENT
Start: 2022-05-12 | End: 2022-05-12

## 2022-05-12 RX ORDER — SODIUM CHLORIDE 9 MG/ML
1000 INJECTION, SOLUTION INTRAVENOUS
Refills: 0 | Status: DISCONTINUED | OUTPATIENT
Start: 2022-05-12 | End: 2022-05-14

## 2022-05-12 RX ORDER — SODIUM CHLORIDE 9 MG/ML
500 INJECTION, SOLUTION INTRAVENOUS ONCE
Refills: 0 | Status: COMPLETED | OUTPATIENT
Start: 2022-05-12 | End: 2022-05-12

## 2022-05-12 RX ORDER — GENTAMICIN SULFATE 40 MG/ML
430 VIAL (ML) INJECTION EVERY 24 HOURS
Refills: 0 | Status: DISCONTINUED | OUTPATIENT
Start: 2022-05-12 | End: 2022-05-12

## 2022-05-12 RX ADMIN — Medication 400 MILLIGRAM(S): at 08:04

## 2022-05-12 RX ADMIN — Medication 30 MILLIGRAM(S): at 00:09

## 2022-05-12 RX ADMIN — SODIUM CHLORIDE 1000 MILLILITER(S): 9 INJECTION, SOLUTION INTRAVENOUS at 03:00

## 2022-05-12 RX ADMIN — MORPHINE SULFATE 2 MILLIGRAM(S): 50 CAPSULE, EXTENDED RELEASE ORAL at 00:25

## 2022-05-12 RX ADMIN — CEFEPIME 100 MILLIGRAM(S): 1 INJECTION, POWDER, FOR SOLUTION INTRAMUSCULAR; INTRAVENOUS at 13:59

## 2022-05-12 RX ADMIN — Medication 100 MILLIGRAM(S): at 08:34

## 2022-05-12 RX ADMIN — ONDANSETRON 4 MILLIGRAM(S): 8 TABLET, FILM COATED ORAL at 04:33

## 2022-05-12 RX ADMIN — Medication 30 MILLIGRAM(S): at 10:36

## 2022-05-12 RX ADMIN — MORPHINE SULFATE 2 MILLIGRAM(S): 50 CAPSULE, EXTENDED RELEASE ORAL at 01:32

## 2022-05-12 RX ADMIN — SODIUM CHLORIDE 175 MILLILITER(S): 9 INJECTION, SOLUTION INTRAVENOUS at 11:00

## 2022-05-12 RX ADMIN — Medication 1000 MILLIGRAM(S): at 20:40

## 2022-05-12 RX ADMIN — CEFEPIME 100 MILLIGRAM(S): 1 INJECTION, POWDER, FOR SOLUTION INTRAMUSCULAR; INTRAVENOUS at 22:01

## 2022-05-12 RX ADMIN — Medication 400 MILLIGRAM(S): at 02:22

## 2022-05-12 RX ADMIN — Medication 30 MILLIGRAM(S): at 16:30

## 2022-05-12 RX ADMIN — Medication 1 MILLIGRAM(S): at 13:40

## 2022-05-12 RX ADMIN — Medication 400 MILLIGRAM(S): at 20:12

## 2022-05-12 RX ADMIN — Medication 300 MILLIGRAM(S): at 03:51

## 2022-05-12 RX ADMIN — Medication 30 MILLIGRAM(S): at 11:35

## 2022-05-12 RX ADMIN — Medication 30 MILLIGRAM(S): at 23:48

## 2022-05-12 RX ADMIN — Medication 30 MILLIGRAM(S): at 01:32

## 2022-05-12 RX ADMIN — Medication 1 MILLIGRAM(S): at 12:15

## 2022-05-12 RX ADMIN — Medication 1000 MILLIGRAM(S): at 03:00

## 2022-05-12 RX ADMIN — MORPHINE SULFATE 2 MILLIGRAM(S): 50 CAPSULE, EXTENDED RELEASE ORAL at 06:15

## 2022-05-12 RX ADMIN — MORPHINE SULFATE 2 MILLIGRAM(S): 50 CAPSULE, EXTENDED RELEASE ORAL at 05:39

## 2022-05-12 RX ADMIN — Medication 400 MILLIGRAM(S): at 14:00

## 2022-05-12 RX ADMIN — SODIUM CHLORIDE 500 MILLILITER(S): 9 INJECTION, SOLUTION INTRAVENOUS at 05:00

## 2022-05-12 RX ADMIN — Medication 2 MILLIGRAM(S): at 20:56

## 2022-05-12 RX ADMIN — Medication 1000 MILLIGRAM(S): at 09:04

## 2022-05-12 NOTE — CONSULT NOTE ADULT - ASSESSMENT
After Visit Summary   4/23/2018    Leopoldo Zhang    MRN: [id number]           Patient Information     Date Of Birth          1973        Visit Information        Provider Department      4/23/2018 4:30 PM CARRANZA DCR2 [facility]        Today's Diagnoses     ICD (implantable cardioverter-defibrillator) in place    -  1       Follow-ups after your visit        Your next 10 appointments already scheduled     Apr 23, 2018  4:30 PM CDT   Remote ICD Check with CARRANZA DCR2   [facility] ([facility])    [address] [address] [phone] OPT 2           This appointment is for a remote check of your debrillator.  This is not an appointment at the office.            May 23, 2018  9:00 AM CDT   (Arrive by 8:45 AM)   Implanted Defibulator with Uc Cv Device 1   [facility] ([facility])    [address] [place] [address]  [address] [place] [id number] [phone]            May 23, 2018 10:00 AM CDT   (Arrive by 9:45 AM)   RETURN ARRHYTHMIA with Bhaskar Leung MD   [facility] ([facility])    [address] [place] [address]  [address] [place] [address] [phone]            Jul 03, 2018  4:30 PM CDT   Remote ICD Check with CARRANZA DCR2   [facility] ([facility])    [address] [id number] [phone] OPT 2           This appointment is for a remote check of your debrillator.  This is not an appointment at the office.            Aug 24, 2018  7:30 AM CDT   LAB with [facility] LAB   [facility] [place] ([facility])    [address] [address] [phone]           Please do not eat 10-12 hours before your appointment if you are coming in fasting for labs on lipids, cholesterol, or glucose (sugar).   section .   Post partum endometritis with Enterobacter bacteremia.   Doesn't appear to be from urine.   CT with post op changes and surgical site looks good.   Prefer Cefepime 2GM IV q8h. Amoxicillin caused diffuse pruritus years ago but low concern for cross reaction.   Repeat blood cultures.     Discussed with OB    James Crockett MD   Infectious Disease   Available on TEAMS. After 5PM and on weekends please page fellow on call or call [phone] This does not apply to pregnant women. Water, hot tea and black coffee (with nothing added) are okay. Do not drink other fluids, diet soda or chew gum.            Aug 24, 2018  7:45 AM CDT   Return Visit with Pj Dickey MD   [facility] ([facility])    [address] [place] [address] [address]  [place] [address] [phone] OPT 2              Who to contact     If you have questions or need follow up information about today's clinic visit or your schedule please contact [facility] directly at [phone].  Normal or non-critical lab and imaging results will be communicated to you by [employer], letter or phone within 4 business days after the clinic has received the results. If you do not hear from us within 7 days, please contact the clinic through [employer] or phone. If you have a critical or abnormal lab result, we will notify you by phone as soon as possible.  Submit refill requests through [employer] or call your pharmacy and they will forward the refill request to us. Please allow 3 business days for your refill to be completed.          Additional Information About Your Visit        [employer] Information     [employer] gives you secure access to your electronic health record. If you see a primary care provider, you can also send messages to your care team and make appointments. If you have questions, please call your primary care clinic.  If you do not have a primary care provider, please call [phone] and they will assist you.        Care EveryWhere ID     This is your Care EveryWhere ID. This could be used by other organizations to access your [place] medical records  [id number]         Blood Pressure from Last 3 Encounters:   04/18/18 117/70   04/06/18 107/67   04/04/18 122/54    Weight from Last 3 Encounters:   04/18/18 100.6 kg (221 lb 12.8 oz)   04/06/18 97.9 kg (215 lb 14.4 oz)   04/04/18 98.4 kg (217 lb)               Today, you had the following     No orders found for display       Primary Care Provider Office Phone # Fax #    Pj Dickey [phone] [address]  [facility] [address]        Equal Access to Services     PHYLLIS URIBE : Hadii aad ku hadprospero Soomaali, waaxda luqadaha, qaybta kaalmada adeegyada, melissa rosaliain hayaan brendarachele jackson tres . So [facility] [phone].    ATENCIÓN: Si habla español, tiene a barcenas disposición servicios gratuitos de asistencia lingüística. Llame al [phone].    We comply with applicable federal civil rights laws and [place] laws. We do not discriminate on the basis of race, color, national origin, age, disability, sex, sexual orientation, or gender identity.            Thank you!     Thank you for choosing [facility]  for your care. Our goal is always to provide you with excellent care. Hearing back from our patients is one way we can continue to improve our services. Please take a few minutes to complete the written survey that you may receive in the mail after your visit with us. Thank you!             Your Updated Medication List - Protect others around you: Learn how to safely use, store and throw away your medicines at www.disposemymeds.org.          This list is accurate as of 4/23/18  3:45 PM.  Always use your most recent med list.                   Brand Name Dispense Instructions for use Diagnosis    ANASTROZOLE PO      Take 0.5 mg by mouth once a week        carvedilol 25 MG tablet    COREG    60 tablet    Take 1 tablet (25 mg) by mouth 2 times daily (with meals)    Chronic systolic congestive heart failure (H)       lisinopril 5 MG tablet    PRINIVIL/ZESTRIL    60 tablet    Take 1 tablet (5 mg) by mouth 2 times daily    Dilated cardiomyopathy (H)       LORazepam 0.5 MG tablet    ATIVAN    20 tablet    Take 1 tablet (0.5 mg) by mouth every 6 hours as needed for anxiety    Anxiety       propafenone 225 MG 12  hr capsule    RYTHMOL SR    180 capsule    Take 1 capsule (225 mg) by mouth 2 times daily    Ventricular tachycardia (H)       simvastatin 20 MG tablet    ZOCOR    90 tablet    Take 1 tablet (20 mg) by mouth daily    Hyperlipidemia LDL goal <100       testosterone cypionate 200 MG/ML injection    DEPOTESTOTERONE     Inject 150 mg into the muscle once a week Tuesdays.            section .   Post partum endometritis with Enterobacter bacteremia.   Doesn't appear to be from urine.   CT with post op changes and surgical site looks good.   Still with fevers today.     Suggest  -prefer Cefepime 2GM IV q8h - Amoxicillin caused diffuse pruritus years ago but low concern for cross reaction   -repeat blood cultures two sets  -f/u sensitivities     Discussed with OB    James Crockett MD   Infectious Disease   Available on TEAMS. After 5PM and on weekends please page fellow on call or call [phone]

## 2022-05-12 NOTE — CHART NOTE - NSCHARTNOTEFT_GEN_A_CORE
R4 Chart Note    Patient evaluated at bedside with TONY Bryant PGY-1    Patient is still distended and uncomfortable. She is belching and feels increased work of breathing from the pressure of her abdomen. Has not yet passed flatus. She had significant difficulty laying flat for the scans.    imaging prelim shows air fluid levels with no discrete obstruction point  Likely Ddx is ileus v SBO    She has not had additional emesis since her initial bout. Nausea has improved but she does have significant pain from the gaseous distension. She feels better now in bed sitting up and denies any acute complaints.    ICU Vital Signs Last 24 Hrs  T(C): 37.3 (12 May 2022 00:12), Max: 37.9 (11 May 2022 06:10)  T(F): 99.1 (12 May 2022 00:12), Max: 100.2 (11 May 2022 06:10)  HR: 111 (12 May 2022 00:12) (110 - 116)  BP: 112/79 (12 May 2022 00:12) (110/70 - 122/80)  RR: 20 (12 May 2022 00:12) (18 - 20)  SpO2: 96% (12 May 2022 00:12) (95% - 100%)    NAD, appears tired  heart: tachycardic  abd: distended with gas, tympanic, incision c/d/i with staples in place    Discuss the positive blood cultures, continuing IV antibiotics, and awaiting ID consult in AM.  Discussed possible need for NGT if patient has on going nausea/emesis.    All questions answered to her satisfaction.  Patient's disposition is guarded, will continue serial abdominal exams   Await final abdominal imaging reads, ID recs    -C/w maintenance fluids  -1:1 repletion of emesis  -strict I&O  -NPO  -IV antimetic, analgesia  -c/w IV gent/clinda    D/w Dr. Jonh Ellison PGY-4

## 2022-05-12 NOTE — CONSULT NOTE ADULT - SUBJECTIVE AND OBJECTIVE BOX
HPI:  R2 H&P    Pt is a 43 y/o  at 37w2d admitted for scheduled pCS  Patient denies contractions, vaginal bleeding, LOF, FM felt.   Last PO intake  Prenatal course uncomplicated  GBS   EFW 7lbs 1oz - 3200g    OBHx: G1- current  GynHx: +endometriosis, +fibroids, + R ovarian cyst s/p cystectomy, +hx abnormal PAPs (most recent pap wnl), denies hx of STI  PMHx: denies  PSHx:   2018 - abdominal myomectomy  2019 - RA LSC BS, excision of endometriosis, R ovarian cystectomy, myomectomy, cystoscopy, D&Cx1, hysteroscopy    Med: denies  All: NKDA  SH: denies t/a/d  Psych: +anxiety/depression (sees therapist)     (09 May 2022 08:28)      PAST MEDICAL & SURGICAL HISTORY:  ANA (iron deficiency anemia)      Low back pain      History of ovarian cyst      History of endometriosis      Obesity  BMI 42      History of cardiac murmur      Benign cyst of skin  removal at age 16 from temple      History of myomectomy  2018      History of appendectomy  2019      History of tubal ligation  Excision [id number]          Allergies    amoxicillin (Other)    Intolerances        ANTIMICROBIALS:  cefepime   IVPB 2000 every 8 hours      OTHER MEDS:  acetaminophen   IVPB .. 1000 milliGRAM(s) IV Intermittent once  acetaminophen   IVPB .. 1000 milliGRAM(s) IV Intermittent once  diphtheria/tetanus/pertussis (acellular) Vaccine (ADAcel) 0.5 milliLiter(s) IntraMuscular once  ketorolac   Injectable 30 milliGRAM(s) IV Push every 6 hours  lactated ringers. 1000 milliLiter(s) IV Continuous <Continuous>  lanolin Ointment 1 Application(s) Topical every 6 hours PRN  LORazepam   Injectable 1 milliGRAM(s) IV Push once  morphine  - Injectable 2 milliGRAM(s) IV Push every 4 hours PRN  ondansetron Injectable 4 milliGRAM(s) IV Push every 8 hours PRN      SOCIAL HISTORY:    FAMILY HISTORY:  FH: type 2 diabetes (Father)    FH: HTN (hypertension) (Father)        ROS:  Unobtainable because:   All other systems negative   Constitutional: no fever, no chills  Eye: no vision changes  ENT: no sore throat, no rhinorrhea  Cardiovascular: no chest pain, no palpitation  Respiratory: no SOB, no cough  GI:  no abd pain, no vomiting, no diarrhea  urinary: no dysuria, no hematuria, no flank pain  musculoskeletal: no joint pain, no joint swelling  skin: no rash  neurology: no headache, no change in mental status  psych: no anxiety    Physical Exam:  General: awake, alert, non toxic  Head: atraumatic, normocephalic  Eyes: normal sclera and conjunctiva  ENT: no oropharyngeal lesions, no LAD, neck supple  Cardio: regular rate and rhythm   Respiratory: nonlabored on room air, clear bilaterally, no wheezing  abd: soft, bowel sounds present, not tender  : no suprapubic tenderness, no glover  Musculoskeletal: no joint swelling, no edema  Skin: no rash  vascular: no phlebitis  Neurologic: no focal deficits  psych: normal affect       Drug Dosing Weight  Height (cm): 170.2 (09 May 2022 08:15)  Weight (kg): 122.5 (09 May 2022 08:15)  BMI (kg/m2): 42.3 (09 May 2022 08:15)  BSA (m2): 2.3 (09 May 2022 08:15)    Vital Signs Last 24 Hrs  T(F): 98.8 (22 @ 11:30), Max: 101.9 (05-10-22 @ 23:40)    Vital Signs Last 24 Hrs  HR: 120 (22 @ 12:49) (110 - 136)  BP: 107/56 (22 @ 12:49) (107/56 - 150/70)  RR: 22 (22 @ 10:25)  SpO2: 98% (22 @ 12:49) (96% - 100%)  Wt(kg): --                          11.9   18.06 )-----------( 214      ( 12 May 2022 07:08 )             36.2           135  |  103  |  12  ----------------------------<  111<H>  4.0   |  21<L>  |  0.64    Ca    9.0      12 May 2022 07:04    TPro  5.8<L>  /  Alb  2.8<L>  /  TBili  0.5  /  DBili  x   /  AST  15  /  ALT  7<L>  /  AlkPhos  149<H>  05-12      Urinalysis Basic - ( 11 May 2022 00:55 )    Color: Light Yellow / Appearance: Clear / S.013 / pH: x  Gluc: x / Ketone: Small  / Bili: Negative / Urobili: Negative   Blood: x / Protein: Negative / Nitrite: Negative   Leuk Esterase: Negative / RBC: x / WBC x   Sq Epi: x / Non Sq Epi: x / Bacteria: x        MICROBIOLOGY:  Culture - Urine (collected 22 @ 00:54)  Source: Catheterized Catheterized  Final Report (22 @ 23:51):    No growth    Culture - Blood (collected 22 @ 00:26)  Source: .Blood Blood-Peripheral  Gram Stain (22 @ 09:51):    Growth in anaerobic bottle: Gram Negative Rods    Growth in aerobic bottle: Gram Negative Rods  Preliminary Report (22 @ 09:51):    Growth in anaerobic bottle: Gram Negative Rods    Growth in aerobic bottle: Gram Negative Rods    ***Blood Panel PCR results on this specimen are available    approximately 3 hours after the Gram stain result.***    Gram stain, PCR, and/or culture results may not always    correspond due to difference in methodologies.    ************************************************************    This PCR assay was performed by multiplex PCR. This    Assay tests for 66 bacterial and resistance gene targets.    Please refer to the [facility] Labs test directory    at [email or web address] for details.  Organism: Blood Culture PCR (22 @ 21:32)  Organism: Blood Culture PCR (22 @ 21:32)      -  Enterobacter (non-cloacae complex): Detec      Method Type: PCR    Culture - Blood (collected 22 @ 00:26)  Source: .Blood Blood-Peripheral  Gram Stain (22 @ 17:33):    Growth in anaerobic bottle: Gram Negative Rods  Preliminary Report (22 @ 17:33):    Growth in anaerobic bottle: Gram Negative Rods    RADIOLOGY:  Images below reviewed personally  CT Abdomen and Pelvis w/ Oral Cont and w/ IV Cont (22 @ 01:03)   Motion and streak artifact limited study.  Status post  with mildly loculated fluid admixed with foci of   air above the decompressed urinary bladder and anterior to the uterus   contiguous with the  incision.  Several mildly dilated loops of small bowel in the left hemiabdomen   without discrete transition point, favoring ileus in the setting of   recent procedure versus partial small bowel obstruction.  Bibasilar subsegmental atelectasis.   HPI:  42F admitted for scheduled  section, performed .   Baby is doing well.   Post op developed sepsis from Enterobacter bacteremia.   Presumed endometritis.   Uncomfortable and sore to her abdomen but wound has looked clean. No diarrhea. No dysuria.   On Clindamycin and Gentamicin.       PAST MEDICAL & SURGICAL HISTORY:  ANA (iron deficiency anemia)  Obesity BMI 42  History of cardiac murmur  History of myomectomy 2018  History of appendectomy   History of tubal ligation  Excision [id number]          Allergies    amoxicillin - diffuse itching years ago, no rash, wheezing or swelling     Intolerances        ANTIMICROBIALS:  cefepime   IVPB 2000 every 8 hours      OTHER MEDS:  acetaminophen   IVPB .. 1000 milliGRAM(s) IV Intermittent once  acetaminophen   IVPB .. 1000 milliGRAM(s) IV Intermittent once  diphtheria/tetanus/pertussis (acellular) Vaccine (ADAcel) 0.5 milliLiter(s) IntraMuscular once  ketorolac   Injectable 30 milliGRAM(s) IV Push every 6 hours  lactated ringers. 1000 milliLiter(s) IV Continuous <Continuous>  lanolin Ointment 1 Application(s) Topical every 6 hours PRN  LORazepam   Injectable 1 milliGRAM(s) IV Push once  morphine  - Injectable 2 milliGRAM(s) IV Push every 4 hours PRN  ondansetron Injectable 4 milliGRAM(s) IV Push every 8 hours PRN      SOCIAL HISTORY: Nonsmoker    FAMILY HISTORY:  FH: type 2 diabetes (Father)  FH: HTN (hypertension) (Father)    ROS:  All other systems negative   Constitutional: fever, chills  Eye: no vision changes  ENT: no sore throat, no rhinorrhea  Cardiovascular: no chest pain, no palpitation  Respiratory: no SOB, no cough  GI:  abd sore. no diarrhea  urinary: no dysuria  musculoskeletal: no joint pain  skin: no rash  neurology: no headache   psych: no anxiety    Physical Exam:  General: awake, alert, non toxic  Head: atraumatic, normocephalic  Eyes: normal sclera and conjunctiva  ENT: no LAD, neck supple  Cardio: tachycardic   Respiratory: nonlabored on room air, grossly clear bilaterally, no wheezing  abd: distended, soft, bowel sounds present, not tender. midline vertical surgical site intact, not inflamed   : no suprapubic tenderness, no glover  Musculoskeletal: atraumatic   Skin: no rash  vascular: no phlebitis  Neurologic: no focal deficits  psych: normal affect       Drug Dosing Weight  Height (cm): 170.2 (09 May 2022 08:15)  Weight (kg): 122.5 (09 May 2022 08:15)  BMI (kg/m2): 42.3 (09 May 2022 08:15)  BSA (m2): 2.3 (09 May 2022 08:15)    Vital Signs Last 24 Hrs  T(F): 98.8 (22 @ 11:30), Max: 101.9 (05-10-22 @ 23:40)    Vital Signs Last 24 Hrs  HR: 120 (22 @ 12:49) (110 - 136)  BP: 107/56 (22 @ 12:49) (107/56 - 150/70)  RR: 22 (22 @ 10:25)  SpO2: 98% (22 @ 12:49) (96% - 100%)  Wt(kg): --                          11.9   18.06 )-----------( 214      ( 12 May 2022 07:08 )             36.2           135  |  103  |  12  ----------------------------<  111<H>  4.0   |  21<L>  |  0.64    Ca    9.0      12 May 2022 07:04    TPro  5.8<L>  /  Alb  2.8<L>  /  TBili  0.5  /  DBili  x   /  AST  15  /  ALT  7<L>  /  AlkPhos  149<H>        Urinalysis Basic - ( 11 May 2022 00:55 )    Color: Light Yellow / Appearance: Clear / S.013 / pH: x  Gluc: x / Ketone: Small  / Bili: Negative / Urobili: Negative   Blood: x / Protein: Negative / Nitrite: Negative   Leuk Esterase: Negative / RBC: x / WBC x   Sq Epi: x / Non Sq Epi: x / Bacteria: x        MICROBIOLOGY:  Culture - Urine (collected 22 @ 00:54)  Source: Catheterized Catheterized  Final Report (22 @ 23:51):    No growth    Culture - Blood (collected 22 @ 00:26)  Source: .Blood Blood-Peripheral  Gram Stain (22 @ 09:51):    Growth in anaerobic bottle: Gram Negative Rods    Growth in aerobic bottle: Gram Negative Rods  Preliminary Report (22 @ 09:51):    Growth in anaerobic bottle: Gram Negative Rods    Growth in aerobic bottle: Gram Negative Rods    ***Blood Panel PCR results on this specimen are available    approximately 3 hours after the Gram stain result.***    Gram stain, PCR, and/or culture results may not always    correspond due to difference in methodologies.    ************************************************************    This PCR assay was performed by multiplex PCR. This    Assay tests for 66 bacterial and resistance gene targets.    Please refer to the [facility] Labs test directory    at [email or web address] for details.  Organism: Blood Culture PCR (22 @ 21:32)  Organism: Blood Culture PCR (22 @ 21:32)      -  Enterobacter (non-cloacae complex): Detec      Method Type: PCR    Culture - Blood (collected 22 @ 00:26)  Source: .Blood Blood-Peripheral  Gram Stain (22 @ 17:33):    Growth in anaerobic bottle: Gram Negative Rods  Preliminary Report (22 @ 17:33):    Growth in anaerobic bottle: Gram Negative Rods    RADIOLOGY:  Images below reviewed personally  CT Abdomen and Pelvis w/ Oral Cont and w/ IV Cont (22 @ 01:03)   Motion and streak artifact limited study.  Status post  with mildly loculated fluid admixed with foci of   air above the decompressed urinary bladder and anterior to the uterus   contiguous with the  incision.  Several mildly dilated loops of small bowel in the left hemiabdomen   without discrete transition point, favoring ileus in the setting of   recent procedure versus partial small bowel obstruction.  Bibasilar subsegmental atelectasis.

## 2022-05-12 NOTE — CHART NOTE - NSCHARTNOTEFT_GEN_A_CORE
R3 Chart Note     Called by RN that patients NGT had dislodged.   Patient seen and evaluated at bedside with ENT.   Verbal consent obtained from patient.   NGT replaced by ENT under visualization using laryngoscope for visualization.   Set to suction. ~200cc bilious output.   Patient tolerated well.     Karis, PGY-3 R3 Chart Note     Called by RN that patients NGT had dislodged.   Patient seen and evaluated at bedside with ENT.   Verbal consent obtained from patient.   NGT replaced by ENT using laryngoscope for visualization.   Set to suction. ~200cc bilious output.   Patient tolerated well.     Karis, PGY-3

## 2022-05-12 NOTE — PROGRESS NOTE ADULT - SUBJECTIVE AND OBJECTIVE BOX
INTERVAL HPI/OVERNIGHT EVENTS:     KEY GARCIA was seen and examined at bedside. POD #3   Patient is a 42y Female Status post C/S on 22. Uterus was adherent posteriorly from previous myomectomy but no other adhesions were encountered. She complains of RUQ pain. She had an episode of bilious emesis last night and again this am. She denies flatus or BM.  She was noted to be febrile on the evening of 5/10. Cultures were drawn and she was started on cleocin and gentamycin. Abdominal pain has worsen and she had Xray of the abdomin which noted dilated loops of bowl. CT notes distended  loop of bowel and fluid collection anterior to the uterus..    MEDICATIONS  (STANDING):  acetaminophen   IVPB .. 1000 milliGRAM(s) IV Intermittent once  acetaminophen   IVPB .. 1000 milliGRAM(s) IV Intermittent once  acetaminophen   IVPB .. 1000 milliGRAM(s) IV Intermittent once  clindamycin IVPB      clindamycin IVPB 900 milliGRAM(s) IV Intermittent every 8 hours  diphtheria/tetanus/pertussis (acellular) Vaccine (ADAcel) 0.5 milliLiter(s) IntraMuscular once  gentamicin   IVPB 430 milliGRAM(s) IV Intermittent every 24 hours  heparin   Injectable 49918 Unit(s) SubCutaneous every 12 hours  ketorolac   Injectable 30 milliGRAM(s) IV Push every 6 hours  lactated ringers Bolus 1000 milliLiter(s) IV Bolus once  lactated ringers. 1000 milliLiter(s) (175 mL/Hr) IV Continuous <Continuous>    MEDICATIONS  (PRN):  lanolin Ointment 1 Application(s) Topical every 6 hours PRN Sore Nipples  morphine  - Injectable 2 milliGRAM(s) IV Push every 4 hours PRN Moderate Pain (4 - 6)  ondansetron Injectable 4 milliGRAM(s) IV Push every 8 hours PRN Nausea and/or Vomiting    PAST MEDICAL & SURGICAL HISTORY:  ANA (iron deficiency anemia)      Low back pain      History of ovarian cyst      History of endometriosis      Obesity  BMI 42      History of cardiac murmur      Benign cyst of skin  removal at age 16 from temple      History of myomectomy  2018      History of appendectomy        History of tubal ligation  Excision [id number]        Vital Signs Last 24 Hrs  T(C): 37.3 (12 May 2022 00:12), Max: 37.9 (11 May 2022 06:10)  T(F): 99.1 (12 May 2022 00:12), Max: 100.2 (11 May 2022 06:10)  HR: 111 (12 May 2022 00:12) (110 - 116)  BP: 112/79 (12 May 2022 00:12) (110/70 - 122/80)  BP(mean): --  RR: 20 (12 May 2022 00:12) (18 - 20)  SpO2: 96% (12 May 2022 00:12) (95% - 100%)    PHYSICAL EXAM:    General: w/F female; well developed, well nourished in moderate distress oriented x 3.  HEENT:  wnl  Lungs:   clear to A+P  Cardiac:   RR no murmurs  Abdomin:  ( -) BS, soft, tender in the epigastric area with distension   Uterus:  Fundus midline; firm  Vaginal bleeding: mild  Extremities: slight swelling                         11.8   18.79 )-----------( 195      ( 11 May 2022 22:31 )             34.8     05-    136  |  105  |  10  ----------------------------<  114<H>  3.9   |  19<L>  |  0.55    Ca    8.8      11 May 2022 22:31    TPro  5.5<L>  /  Alb  2.8<L>  /  TBili  0.5  /  DBili  0.1  /  AST  17  /  ALT  8<L>  /  AlkPhos  143<H>  05-11    PT/INR - ( 11 May 2022 00:27 )   PT: 12.3 sec;   INR: 1.07 ratio         PTT - ( 11 May 2022 00:27 )  PTT:29.1 sec  Urinalysis Basic - ( 11 May 2022 00:55 )    Color: Light Yellow / Appearance: Clear / S.013 / pH: x  Gluc: x / Ketone: Small  / Bili: Negative / Urobili: Negative   Blood: x / Protein: Negative / Nitrite: Negative   Leuk Esterase: Negative / RBC: x / WBC x   Sq Epi: x / Non Sq Epi: x / Bacteria: x          RADIOLOGY & ADDITIONAL TESTS:

## 2022-05-12 NOTE — OB POSTPARTUM EVENT NOTE - NS_EVENTSUMMARY1_OBGYN_ALL_OB_FT
Assumed care of this 43 y/o  s/p primary classical c/section. Pt had episodes of bilious emesis with febrile temperatures up to 100.9F.  Received pt stable sitting in chair c/o mild upper abdominal pain and tenderness. IV  ml infusing to R-AC (20g); VS: , /77, R30, T38.4. Pt. currently being treated with Clindamycin and Gentamicin for gram negative jesica bacteremia. Abdominal assessment revealed: soft, mildly distended abdomen, with hypoactive bowel sounds in all quadrants. Wound clean, dry and intact with staples and abdominal padding. Lochia is rubra and scant. Unable to palpate fundus due to pt complaint of pain.   Pt ambulated to bathroom and voided 175 ml @0940 am.  Spouse at bedside providing emotional support.

## 2022-05-12 NOTE — CONSULT NOTE ADULT - SUBJECTIVE AND OBJECTIVE BOX
KEY GARCIA [id number]    HPI:  42F s/p C section on , surgery consulted for bilious emesis. Patient started having nausea and vomiting last night, vomitted twice in total, last was 5am this morning. Currently no nausea. Last gas was early yesterday and last BM was before the C section. Patient is currently being treated for GNR bacteremia, unclear source ?endometritis. CT scan shows ileus. Surgery was consulted for evaluation. NGT attempted 3 times this morning by OBGYN team. Patient currently refusing further NG tube attempts.    PAST MEDICAL & SURGICAL HISTORY:  ANA (iron deficiency anemia)  Low back pain  History of ovarian cyst  History of endometriosis  Obesity  BMI 42  History of cardiac murmur  Benign cyst of skin  removal at age 16 from temple  History of myomectomy  2018  History of appendectomy  2019  History of tubal ligation  Excision [id number]    MEDICATIONS  (STANDING):  acetaminophen   IVPB .. 1000 milliGRAM(s) IV Intermittent once  acetaminophen   IVPB .. 1000 milliGRAM(s) IV Intermittent once  clindamycin IVPB      clindamycin IVPB 900 milliGRAM(s) IV Intermittent every 8 hours  diphtheria/tetanus/pertussis (acellular) Vaccine (ADAcel) 0.5 milliLiter(s) IntraMuscular once  gentamicin   IVPB 430 milliGRAM(s) IV Intermittent every 24 hours  ketorolac   Injectable 30 milliGRAM(s) IV Push every 6 hours  lactated ringers. 1000 milliLiter(s) (175 mL/Hr) IV Continuous <Continuous>    MEDICATIONS  (PRN):  lanolin Ointment 1 Application(s) Topical every 6 hours PRN Sore Nipples  morphine  - Injectable 2 milliGRAM(s) IV Push every 4 hours PRN Moderate Pain (4 - 6)  ondansetron Injectable 4 milliGRAM(s) IV Push every 8 hours PRN Nausea and/or Vomiting    Allergies  amoxicillin (Other)  Intolerances    REVIEW OF SYSTEMS  [ x ] A ten-point review of systems was otherwise negative except as noted.  [ ] Due to altered mental status/intubation, subjective information were not able to be obtained from the patient. History was obtained, to the extent possible, from review of the chart and collateral sources of information.      Vital Signs Last 24 Hrs  T(C): 38.4 (12 May 2022 07:55), Max: 38.4 (12 May 2022 07:55)  T(F): 101.1 (12 May 2022 07:55), Max: 101.1 (12 May 2022 07:55)  HR: 124 (12 May 2022 09:56) (110 - 132)  BP: 150/70 (12 May 2022 09:56) (110/80 - 150/70)  BP(mean): 81 (12 May 2022 09:49) (81 - 98)  RR: 20 (12 May 2022 09:56) (18 - 24)  SpO2: 96% (12 May 2022 09:56) (96% - 100%)    PHYSICAL EXAM:  GENERAL: NAD  ABDOMEN: Soft, periincisional tenderness, mild to moderate distension  EXTREMITIES:  LE mild edema      LABS:  Labs:  CAPILLARY BLOOD GLUCOSE                              11.9   18.06 )-----------( 214      ( 12 May 2022 07:08 )             36.2       Auto Neutrophil %: 92.5 % (22 @ 07:08)  Auto Immature Granulocyte %: 1.1 % (22 @ 07:08)  Auto Neutrophil %: 87.8 % (22 @ 22:31)  Band Neutrophils %: 7.0 % (22 @ 22:31)        135  |  103  |  12  ----------------------------<  111<H>  4.0   |  21<L>  |  0.64      Calcium, Total Serum: 9.0 mg/dL (22 @ 07:04)      LFTs:             5.8  | 0.5  | 15       ------------------[149     ( 12 May 2022 07:04 )  2.8  | x    | 7           Lipase:x      Amylase:x         Lactate, Blood: 1.2 mmol/L (22 @ 23:15)  Lactate, Blood: 1.6 mmol/L (22 @ 00:27)      Coags:     15.2   ----< 1.31    ( 12 May 2022 07:08 )     29.8                Urinalysis Basic - ( 11 May 2022 00:55 )    Color: Light Yellow / Appearance: Clear / S.013 / pH: x  Gluc: x / Ketone: Small  / Bili: Negative / Urobili: Negative   Blood: x / Protein: Negative / Nitrite: Negative   Leuk Esterase: Negative / RBC: x / WBC x   Sq Epi: x / Non Sq Epi: x / Bacteria: x        Culture - Urine (collected 11 May 2022 00:54)  Source: Catheterized Catheterized  Final Report (11 May 2022 23:51):    No growth    Culture - Blood (collected 11 May 2022 00:26)  Source: .Blood Blood-Peripheral  Gram Stain (12 May 2022 09:51):    Growth in anaerobic bottle: Gram Negative Rods    Growth in aerobic bottle: Gram Negative Rods  Preliminary Report (12 May 2022 09:51):    Growth in anaerobic bottle: Gram Negative Rods    Growth in aerobic bottle: Gram Negative Rods    ***Blood Panel PCR results on this specimen are available    approximately 3 hours after the Gram stain result.***    Gram stain, PCR, and/or culture results may not always    correspond due to difference in methodologies.    ************************************************************    This PCR assay was performed by multiplex PCR. This    Assay tests for 66 bacterial and resistance gene targets.    Please refer to the [facility] Labs test directory    at [email or web address] for details.  Organism: Blood Culture PCR (11 May 2022 21:32)  Organism: Blood Culture PCR (11 May 2022 21:32)    Culture - Blood (collected 11 May 2022 00:26)  Source: .Blood Blood-Peripheral  Gram Stain (11 May 2022 17:33):    Growth in anaerobic bottle: Gram Negative Rods  Preliminary Report (11 May 2022 17:33):    Growth in anaerobic bottle: Gram Negative Rods    RADIOLOGY & ADDITIONAL STUDIES:  < from: CT Abdomen and Pelvis w/ Oral Cont and w/ IV Cont (22 @ 01:03) >  Motion and streak artifact limited study.    Status post  with mildly loculated fluid admixed with foci of   air above the decompressed urinary bladder and anterior to the uterus   contiguous with the  incision.    Several mildly dilated loops of small bowel in the left hemiabdomen   without discrete transition point, favoring ileus in the setting of   recent procedure versus partial small bowel obstruction.    Bibasilar subsegmental atelectasis.    < end of copied text >

## 2022-05-12 NOTE — CONSULT NOTE ADULT - ASSESSMENT
Assessment:  42y Female with POD3 from classical C section, multiple abdominal surgical hx, with 2 episodes of vomiting. Nausea resolved a this time. Has not passed gas since yesterday morning. CT showing ileus. Currently has GNR bacteremia getting antibiotics.    Plan:  - NPO, IVF  - currently refusing further NGT attempts, if patients vomits again would recommend NG tube  - encourage ambulation  - avoid opioids  - continue incentive spirometry  - check electrolytes and replete as needed  - plan dw  [place]    Surgery  [id number]

## 2022-05-12 NOTE — CHART NOTE - NSCHARTNOTEFT_GEN_A_CORE
R3 Note    Decision made to transfer patient from 3KNW to OB PACU for higher level of care do to worsening status, for 1:1 nursing care.  Patient evaluated at time of transfer.  Patient endorsing epigastric pain as chief complaint currently w/ associated nausea/retching, no recent episodes of emesis.  Patient in so much pain she is struggling to articulate.  Reports pain improved with sitting up in chair as opposed to recliner or stretcher.      Vital Signs Last 24 Hrs  T(C): 36.8 (12 May 2022 06:00), Max: 37.5 (11 May 2022 16:45)  T(F): 98.2 (12 May 2022 06:00), Max: 99.5 (11 May 2022 16:45)  HR: 129 (12 May 2022 06:00) (110 - 129)  BP: 115/78 (12 May 2022 06:00) (110/80 - 122/80)  BP(mean): --  RR: 20 (12 May 2022 06:00) (18 - 20)  SpO2: 96% (12 May 2022 06:00) (95% - 100%)    Gen: Patient visibly uncomfortable and appears short of breath 2/2 to pain  Abd: soft, distended tenderness to palpation in right epigastrum    A/P: 41 yo  POD#3 s/p pMTCS for hx of fibroids and endometriosis w/ prior abdominal pain w/ worsening abdominal pain associated w/ onset of bilious emesis.  CT performed overnight with multiple loops of distended bowel but no transition point for SGO.  Patient is in acute pain however maintaining vitals, afebrile, w/o lactate, no evidence of acute septic shock.    #Abdominal pain  -s/p attempt of NGT x3, blockage in left nostil causing difficulty, may require OG tube for decompression  -NPO  -LR@125  -pain control as needed  -general surgery evaluation pending  -f/u final CT results    #Endometritis  -Febrile event to 38.8C on 5/10, presumed endometritis  -afebrile since then  -BCx w/ gram negative rods  -currently on gent/clinda  -ID consult pending  -UCx NGT  -lactate wnl    #Maternal well being  -HSQ held in case of surgical management   -NPO  -pain medication as needed    Pt seen on transfer w/ Dr. Gutierrez and Dr. Griggs, safety officers  Dr. Jonh loya  [facility] PGY3 R3 Note    Decision made to transfer patient from 3KNW to OB PACU for higher level of care do to worsening status, for 1:1 nursing care.  Patient evaluated at time of transfer.  Patient endorsing epigastric pain as chief complaint currently w/ associated nausea/retching, no recent episodes of emesis.  Patient in so much pain she is struggling to articulate.  Reports pain improved with sitting up in chair as opposed to recliner or stretcher.      Vital Signs Last 24 Hrs  T(C): 36.8 (12 May 2022 06:00), Max: 37.5 (11 May 2022 16:45)  T(F): 98.2 (12 May 2022 06:00), Max: 99.5 (11 May 2022 16:45)  HR: 129 (12 May 2022 06:00) (110 - 129)  BP: 115/78 (12 May 2022 06:00) (110/80 - 122/80)  BP(mean): --  RR: 20 (12 May 2022 06:00) (18 - 20)  SpO2: 96% (12 May 2022 06:00) (95% - 100%)    Gen: Patient visibly uncomfortable and appears short of breath 2/2 to pain  Abd: soft, distended tenderness to palpation in right epigastrum    A/P: 41 yo  POD#3 s/p pMTCS for hx of fibroids and endometriosis w/ prior abdominal pain w/ worsening abdominal pain associated w/ onset of bilious emesis.  CT performed overnight with multiple loops of distended bowel but no transition point for SGO.  Patient is in acute pain however maintaining vitals, afebrile, w/o lactate, no evidence of acute septic shock.    #Abdominal pain  -s/p attempt of NGT x3, blockage in left nostil causing difficulty, may require OG tube for decompression  -NPO  -LR@125  -pain control as needed  -general surgery evaluation pending  -f/u final CT results    #Endometritis  -Febrile event to 38.8C on 5/10, presumed endometritis  -afebrile since then  -BCx w/ gram negative rods  -currently on gent/clinda  -ID consult pending  -UCx NGT  -lactate wnl    #Maternal well being  -HSQ held in case of surgical management   -NPO  -pain medication as needed    Pt seen on transfer w/ Dr. Gutierrez and Dr. Griggs, safety officers  Pt seen w/ Dr. Burt, M  Dr. Jonh loya  [facility] PGY3 R3 Note    Decision made to transfer patient from 3KNW to OB PACU for higher level of care do to worsening status, for 1:1 nursing care.  Patient evaluated at time of transfer.  Patient endorsing epigastric pain as chief complaint currently w/ associated nausea/retching, no recent episodes of emesis.  Patient in so much pain she is struggling to articulate.  Reports pain improved with sitting up in chair as opposed to recliner or stretcher.      Vital Signs Last 24 Hrs  T(C): 36.8 (12 May 2022 06:00), Max: 37.5 (11 May 2022 16:45)  T(F): 98.2 (12 May 2022 06:00), Max: 99.5 (11 May 2022 16:45)  HR: 129 (12 May 2022 06:00) (110 - 129)  BP: 115/78 (12 May 2022 06:00) (110/80 - 122/80)  BP(mean): --  RR: 20 (12 May 2022 06:00) (18 - 20)  SpO2: 96% (12 May 2022 06:00) (95% - 100%)    Gen: Patient visibly uncomfortable and appears short of breath 2/2 to pain  Abd: soft, distended tenderness to palpation in right epigastrum    A/P: 43 yo  POD#3 s/p pMTCS for hx of fibroids and endometriosis w/ prior abdominal pain w/ worsening abdominal pain associated w/ onset of bilious emesis.  CT performed overnight with multiple loops of distended bowel but no transition point for SGO.  Patient is in acute pain however maintaining vitals, afebrile, w/o lactate, no evidence of acute septic shock.    #Abdominal pain  -s/p attempt of NGT x3, blockage in left nostil causing difficulty, may require OG tube for decompression  -NPO  -LR@125  -pain control as needed  -general surgery evaluation pending  -f/u final CT results    #Endometritis  -Febrile event to 38.8C on 5/10, presumed endometritis  -afebrile since then  -BCx w/ gram negative rods  -currently on gent/clinda  -ID consult pending  -UCx NGT  -lactate wnl    #Maternal well being  -HSQ held in case of surgical management   -NPO  -pain medication as needed    Pt seen on transfer w/ Dr. Gutierrez and Dr. Griggs, safety officers  Pt seen w/ Dr. Burt, MFM  Dr. Jonh loya  [facility] PGY3      MFM Attending  POD #3 s/p Mid-transverse  delivery    42 year old  s/p MTCD due to history of prior abdominal myomectomy and removal of extensive endometriosis transferred from 3KN to PACU due to increasing abdominal pain and bilious emesis, unsuccessful NGT placement X3 overnight.  s/p CT Scan (oral contrast)-multiple loops of distended bowel, no transition/inflection point for SBO.  s/p general surgery consult-recommendations appreciated    Febrile- Blood cultures (+) Gram (-) rods-enterococcus-s/p gentamicin/clindamycin switched to cefepime-ID consult appreciated    Patient seen and evaluated by me with the R3OB.  Patient sittting in chair at  bedside with her , just returned from walking. Patient endorses abdominal pain and recently took Motrin. She states pain is due to abdominal distension and recent walk. Ileus vs SBO vs unrecognized bowel injury discussed with the patient and her .    Abd: soft, distended, tender to palpation-LUQ    -Abdominal distension-ileus vs SBO vs bowel injury   -NGT now placed at bedside with ENT   -Continue Cefepime q 8hrs  -Repeat blood cultures  -ID and Gen Surgery recommendations appreciated    Chester Burt MD,MPH

## 2022-05-12 NOTE — CONSULT NOTE ADULT - SUBJECTIVE AND OBJECTIVE BOX
CC: NGT placement    HPI: 43 yo F   with h/o myomectomy (2018) presenting @ 34 weeks of gestation foe scheduled primary  on 22. ENT was consulted for NGT placement for decompression. Multiple attempts by primary team were unsuccessful. Pt was found to have SBO. Pt denies sore throat, cough, hoarseness.          PAST MEDICAL & SURGICAL HISTORY:  ANA (iron deficiency anemia)      Low back pain      History of ovarian cyst      History of endometriosis      Obesity  BMI 42      History of cardiac murmur      Benign cyst of skin  removal at age 16 from temple      History of myomectomy  2018      History of appendectomy  2019      History of tubal ligation  Excision [id number]        Allergies    amoxicillin (Other)    Intolerances      MEDICATIONS  (STANDING):  acetaminophen   IVPB .. 1000 milliGRAM(s) IV Intermittent once  cefepime   IVPB 2000 milliGRAM(s) IV Intermittent every 8 hours  diphtheria/tetanus/pertussis (acellular) Vaccine (ADAcel) 0.5 milliLiter(s) IntraMuscular once  ketorolac   Injectable 30 milliGRAM(s) IV Push every 6 hours  lactated ringers. 1000 milliLiter(s) (175 mL/Hr) IV Continuous <Continuous>    MEDICATIONS  (PRN):  lanolin Ointment 1 Application(s) Topical every 6 hours PRN Sore Nipples  morphine  - Injectable 2 milliGRAM(s) IV Push every 4 hours PRN Moderate Pain (4 - 6)  ondansetron Injectable 4 milliGRAM(s) IV Push every 8 hours PRN Nausea and/or Vomiting      Social History: no tobacco use    Family history: no pertinent FHx    ROS:   ENT: all negative except as noted in HPI   CV: denies palpitations  Pulm: denies SOB, cough, hemoptysis  GI: denies change in apetite, indigestion, n/v  : denies pertinent urinary symptoms, urgency  Neuro: denies numbness/tingling, loss of sensation  Psych: denies anxiety  MS: denies muscle weakness, instability  Heme: denies easy bruising or bleeding  Endo: denies heat/cold intolerance, excessive sweating  Vascular: denies LE edema    Vital Signs Last 24 Hrs  T(C): 37.1 (12 May 2022 11:30), Max: 38.4 (12 May 2022 07:55)  T(F): 98.8 (12 May 2022 11:30), Max: 101.1 (12 May 2022 07:55)  HR: 120 (12 May 2022 12:49) (110 - 136)  BP: 107/56 (12 May 2022 12:49) (107/56 - 150/70)  BP(mean): 77 (12 May 2022 12:49) (77 - 98)  RR: 22 (12 May 2022 10:25) (19 - 24)  SpO2: 98% (12 May 2022 12:49) (96% - 100%)                          11.9   18.06 )-----------( 214      ( 12 May 2022 07:08 )             36.2    05-    135  |  103  |  12  ----------------------------<  111<H>  4.0   |  21<L>  |  0.64    Ca    9.0      12 May 2022 07:04    TPro  5.8<L>  /  Alb  2.8<L>  /  TBili  0.5  /  DBili  x   /  AST  15  /  ALT  7<L>  /  AlkPhos  149<H>  05-12   PT/INR - ( 12 May 2022 07:08 )   PT: 15.2 sec;   INR: 1.31 ratio         PTT - ( 12 May 2022 07:08 )  PTT:29.8 sec    PHYSICAL EXAM:  Gen: NAD  Skin: No rashes, bruises, or lesions  Head: Normocephalic, Atraumatic  Face: no edema, erythema, or fluctuance. Parotid glands soft without mass  Eyes: no scleral injection  Nose: Nares bilaterally patent, no discharge  Mouth: No Stridor / Drooling / Trismus.  Mucosa moist, tongue/uvula midline, oropharynx clear  Neck: Flat, supple, no lymphadenopathy, trachea midline, no masses  Lymphatic: No lymphadenopathy  Resp: breathing easily, no stridor  CV: no peripheral edema/cyanosis  GI: nondistended   Peripheral vascular: no JVD or edema  Neuro: facial nerve intact, no facial droop      Procedure: NGT placement    Procedure Note:  Procedure: NGT placement  Diagnosis: dysphagia  Verbal consent obtained from patient. Lube applied to small keofeed tube. Keofeed advanced through Right nare without resistance under visualization using indirect laryngoscope. Tip of tube visualized in pyriform sinus. Pt asked to swallow. Tube advanced through esophageal inlet without resistance. Gastric contents noted in the NGT and canister. Pt tolerated the procedure well without complications.

## 2022-05-12 NOTE — CONSULT NOTE ADULT - ASSESSMENT
41 yo F   with h/o myomectomy (2018) presenting @ 34 weeks of gestation foe scheduled primary  on 22, found to have SBO S/P NGT placement indirect laryngoscopy.

## 2022-05-12 NOTE — OB POSTPARTUM EVENT NOTE - NS_EVENTFINDINGS1_OBGYN_ALL_OB_FT
Pt consulted by MD Ralph from surgery - plan to revisit insertion of NG tube if patient consents. Pt advised to ambulate and avoid morphine.  Pt consulted by Infectious Disease - plan to change current antibiotic regimen to Cefepime q8hrs and repeat blood cultures  Pt consulted by OB/GYN team and MD Dean - plan to administer anxiolytic prior to insertion of NG tube.    Will continue to monitor and report vital signs and patient status

## 2022-05-12 NOTE — CHART NOTE - NSCHARTNOTEFT_GEN_A_CORE
R4 Chart Note    Patient seen by Dr. Borja at bedside with another large volume of emesis.    NGT placement attempted by Dr. Borja without success.    General surgery consult placed. AM labs pending. Awaiting ID stephanie Ellison PGY-4

## 2022-05-12 NOTE — CHART NOTE - NSCHARTNOTEFT_GEN_A_CORE
ENT:    Patient seen earlier for NGT placement. NGT placed by us with laryngoscopy this afternoon. This evening, NGT fell out per patient. ENT reconsulted for NGT placement. NGT tube successfully replaced again with indirect laryngoscopy. Please confirm placement with x-ray

## 2022-05-12 NOTE — CHART NOTE - NSCHARTNOTEFT_GEN_A_CORE
R3 Note    Patient re-evaluated this PM, after the NGT in for several hours.  Patient sitting calmly in chair.  Patient subjectively improved.   also reports improvement.  Patient still has pain however not as severe as earlier today.    Vital Signs Last 24 Hrs  T(C): 37.6 (12 May 2022 15:49), Max: 38.4 (12 May 2022 07:55)  T(F): 99.6 (12 May 2022 15:49), Max: 101.1 (12 May 2022 07:55)  HR: 120 (12 May 2022 16:30) (110 - 136)  BP: 112/57 (12 May 2022 16:30) (103/50 - 150/70)  BP(mean): 81 (12 May 2022 16:30) (69 - 98)  RR: 26 (12 May 2022 14:19) (19 - 27)  SpO2: 99% (12 May 2022 16:30) (96% - 100%)    Gen: NAD  HEENT: NGT in place w/ bilious output on suction  Abd: soft, diffusely tender, nonperitoneal    43 yo  POD#3 s/p pMTCS for hx of fibroids and endometriosis w/ prior abdominal pain w/ worsening abdominal pain associated w/ onset of bilious emesis.  CT performed overnight with multiple loops of distended bowel but no transition point for SBO.  Pt now s/p NGT placement with improvement in symptoms    -NGT on suction, replete 1:1 w/ normal saline  -repeat BCx pending ()  -c/w cefepime at recommendation of ID  -pain controlled with tylenol and toradol  -AM CBC, BMP/Mg/Phos  -transfer to 3KNW    d/w Dr. Jonh Meade PGY3

## 2022-05-13 DIAGNOSIS — K56.7 ILEUS, UNSPECIFIED: ICD-10-CM

## 2022-05-13 LAB
-  AMIKACIN: SIGNIFICANT CHANGE UP
-  AMPICILLIN/SULBACTAM: SIGNIFICANT CHANGE UP
-  AMPICILLIN: SIGNIFICANT CHANGE UP
-  AZTREONAM: SIGNIFICANT CHANGE UP
-  CEFAZOLIN: SIGNIFICANT CHANGE UP
-  CEFEPIME: SIGNIFICANT CHANGE UP
-  CEFOXITIN: SIGNIFICANT CHANGE UP
-  CEFTRIAXONE: SIGNIFICANT CHANGE UP
-  CIPROFLOXACIN: SIGNIFICANT CHANGE UP
-  ERTAPENEM: SIGNIFICANT CHANGE UP
-  GENTAMICIN: SIGNIFICANT CHANGE UP
-  IMIPENEM: SIGNIFICANT CHANGE UP
-  LEVOFLOXACIN: SIGNIFICANT CHANGE UP
-  MEROPENEM: SIGNIFICANT CHANGE UP
-  PIPERACILLIN/TAZOBACTAM: SIGNIFICANT CHANGE UP
-  TOBRAMYCIN: SIGNIFICANT CHANGE UP
-  TRIMETHOPRIM/SULFAMETHOXAZOLE: SIGNIFICANT CHANGE UP
ANION GAP SERPL CALC-SCNC: 14 MMOL/L — SIGNIFICANT CHANGE UP (ref 5–17)
BUN SERPL-MCNC: 21 MG/DL — SIGNIFICANT CHANGE UP (ref 7–23)
CALCIUM SERPL-MCNC: 8.6 MG/DL — SIGNIFICANT CHANGE UP (ref 8.4–10.5)
CHLORIDE SERPL-SCNC: 104 MMOL/L — SIGNIFICANT CHANGE UP (ref 96–108)
CO2 SERPL-SCNC: 19 MMOL/L — LOW (ref 22–31)
CREAT SERPL-MCNC: 0.58 MG/DL — SIGNIFICANT CHANGE UP (ref 0.5–1.3)
CULTURE RESULTS: SIGNIFICANT CHANGE UP
CULTURE RESULTS: SIGNIFICANT CHANGE UP
EGFR: 117 ML/MIN/1.73M2 — SIGNIFICANT CHANGE UP
GLUCOSE SERPL-MCNC: 86 MG/DL — SIGNIFICANT CHANGE UP (ref 70–99)
HCT VFR BLD CALC: 29.6 % — LOW (ref 34.5–45)
HGB BLD-MCNC: 9.7 G/DL — LOW (ref 11.5–15.5)
MAGNESIUM SERPL-MCNC: 1.6 MG/DL — SIGNIFICANT CHANGE UP (ref 1.6–2.6)
MCHC RBC-ENTMCNC: 31.3 PG — SIGNIFICANT CHANGE UP (ref 27–34)
MCHC RBC-ENTMCNC: 32.8 GM/DL — SIGNIFICANT CHANGE UP (ref 32–36)
MCV RBC AUTO: 95.5 FL — SIGNIFICANT CHANGE UP (ref 80–100)
METHOD TYPE: SIGNIFICANT CHANGE UP
NRBC # BLD: 0 /100 WBCS — SIGNIFICANT CHANGE UP (ref 0–0)
ORGANISM # SPEC MICROSCOPIC CNT: SIGNIFICANT CHANGE UP
PHOSPHATE SERPL-MCNC: 4 MG/DL — SIGNIFICANT CHANGE UP (ref 2.5–4.5)
PLATELET # BLD AUTO: 198 K/UL — SIGNIFICANT CHANGE UP (ref 150–400)
POTASSIUM SERPL-MCNC: 3.5 MMOL/L — SIGNIFICANT CHANGE UP (ref 3.5–5.3)
POTASSIUM SERPL-SCNC: 3.5 MMOL/L — SIGNIFICANT CHANGE UP (ref 3.5–5.3)
RBC # BLD: 3.1 M/UL — LOW (ref 3.8–5.2)
RBC # FLD: 13.7 % — SIGNIFICANT CHANGE UP (ref 10.3–14.5)
SODIUM SERPL-SCNC: 137 MMOL/L — SIGNIFICANT CHANGE UP (ref 135–145)
SPECIMEN SOURCE: SIGNIFICANT CHANGE UP
SPECIMEN SOURCE: SIGNIFICANT CHANGE UP
WBC # BLD: 9.32 K/UL — SIGNIFICANT CHANGE UP (ref 3.8–10.5)
WBC # FLD AUTO: 9.32 K/UL — SIGNIFICANT CHANGE UP (ref 3.8–10.5)

## 2022-05-13 PROCEDURE — 71045 X-RAY EXAM CHEST 1 VIEW: CPT | Mod: 26,77

## 2022-05-13 PROCEDURE — 93010 ELECTROCARDIOGRAM REPORT: CPT

## 2022-05-13 PROCEDURE — 71045 X-RAY EXAM CHEST 1 VIEW: CPT | Mod: 26

## 2022-05-13 PROCEDURE — 99232 SBSQ HOSP IP/OBS MODERATE 35: CPT

## 2022-05-13 RX ORDER — ACETAMINOPHEN 500 MG
1000 TABLET ORAL ONCE
Refills: 0 | Status: COMPLETED | OUTPATIENT
Start: 2022-05-13 | End: 2022-05-13

## 2022-05-13 RX ORDER — MAGNESIUM SULFATE 500 MG/ML
2 VIAL (ML) INJECTION ONCE
Refills: 0 | Status: COMPLETED | OUTPATIENT
Start: 2022-05-13 | End: 2022-05-13

## 2022-05-13 RX ORDER — HEPARIN SODIUM 5000 [USP'U]/ML
10000 INJECTION INTRAVENOUS; SUBCUTANEOUS EVERY 12 HOURS
Refills: 0 | Status: DISCONTINUED | OUTPATIENT
Start: 2022-05-13 | End: 2022-05-16

## 2022-05-13 RX ORDER — POTASSIUM CHLORIDE 20 MEQ
10 PACKET (EA) ORAL
Refills: 0 | Status: COMPLETED | OUTPATIENT
Start: 2022-05-13 | End: 2022-05-13

## 2022-05-13 RX ORDER — SODIUM CHLORIDE 9 MG/ML
800 INJECTION INTRAMUSCULAR; INTRAVENOUS; SUBCUTANEOUS ONCE
Refills: 0 | Status: COMPLETED | OUTPATIENT
Start: 2022-05-13 | End: 2022-05-13

## 2022-05-13 RX ADMIN — Medication 1 MILLIGRAM(S): at 01:16

## 2022-05-13 RX ADMIN — Medication 100 MILLIEQUIVALENT(S): at 17:22

## 2022-05-13 RX ADMIN — Medication 30 MILLIGRAM(S): at 06:13

## 2022-05-13 RX ADMIN — Medication 30 MILLIGRAM(S): at 18:51

## 2022-05-13 RX ADMIN — Medication 30 MILLIGRAM(S): at 19:30

## 2022-05-13 RX ADMIN — Medication 400 MILLIGRAM(S): at 10:09

## 2022-05-13 RX ADMIN — Medication 30 MILLIGRAM(S): at 00:45

## 2022-05-13 RX ADMIN — HEPARIN SODIUM 10000 UNIT(S): 5000 INJECTION INTRAVENOUS; SUBCUTANEOUS at 06:11

## 2022-05-13 RX ADMIN — Medication 100 MILLIEQUIVALENT(S): at 18:25

## 2022-05-13 RX ADMIN — Medication 1000 MILLIGRAM(S): at 10:50

## 2022-05-13 RX ADMIN — HEPARIN SODIUM 10000 UNIT(S): 5000 INJECTION INTRAVENOUS; SUBCUTANEOUS at 18:51

## 2022-05-13 RX ADMIN — CEFEPIME 100 MILLIGRAM(S): 1 INJECTION, POWDER, FOR SOLUTION INTRAMUSCULAR; INTRAVENOUS at 06:13

## 2022-05-13 RX ADMIN — Medication 400 MILLIGRAM(S): at 22:24

## 2022-05-13 RX ADMIN — Medication 400 MILLIGRAM(S): at 03:09

## 2022-05-13 RX ADMIN — Medication 30 MILLIGRAM(S): at 23:56

## 2022-05-13 RX ADMIN — SODIUM CHLORIDE 800 MILLILITER(S): 9 INJECTION INTRAMUSCULAR; INTRAVENOUS; SUBCUTANEOUS at 07:05

## 2022-05-13 RX ADMIN — Medication 25 GRAM(S): at 19:51

## 2022-05-13 RX ADMIN — Medication 1000 MILLIGRAM(S): at 04:12

## 2022-05-13 RX ADMIN — CEFEPIME 100 MILLIGRAM(S): 1 INJECTION, POWDER, FOR SOLUTION INTRAMUSCULAR; INTRAVENOUS at 14:58

## 2022-05-13 RX ADMIN — Medication 400 MILLIGRAM(S): at 16:11

## 2022-05-13 RX ADMIN — Medication 30 MILLIGRAM(S): at 12:36

## 2022-05-13 RX ADMIN — Medication 100 MILLIEQUIVALENT(S): at 16:11

## 2022-05-13 RX ADMIN — CEFEPIME 100 MILLIGRAM(S): 1 INJECTION, POWDER, FOR SOLUTION INTRAMUSCULAR; INTRAVENOUS at 21:37

## 2022-05-13 RX ADMIN — Medication 1000 MILLIGRAM(S): at 16:44

## 2022-05-13 NOTE — PROVIDER CONTACT NOTE (OTHER) - RECOMMENDATIONS
Toradol x 1 dose instead of 1800 Motrin
ambulation pain medsurya plata and cate
Per Ady from trauma, NG tube is not going to be removed overnight.

## 2022-05-13 NOTE — PROGRESS NOTE ADULT - ASSESSMENT
section .   Post partum endometritis with Klebsiella aerogenes bacteremia .   CT with post op changes and surgical site looks good.   Overall better but requiring an NG tube for ileus and still very fatigued.     Suggest  -continue Cefepime 2GM IV q8h - tolerating despite diffuse from Amoxicillin   -f/u repeat blood cultures   -if clinically better next week could use PO Cipro 500mg BID for discharge although she would have to hold breastfeeding until two days after her last dose     Discussed with OB    James Crockett MD   Infectious Disease   Available on TEAMS. After 5PM and on weekends please page fellow on call or call [phone]

## 2022-05-13 NOTE — PROGRESS NOTE ADULT - SUBJECTIVE AND OBJECTIVE BOX
Postoperative day: 4    Surgery: PCS midline transverse complicated with postop ileus.  Patient  is resting more comfortably tonight. She is passing gas and has had a bowel movement,  Complaints: discomfort from the NGT    Physical exam:    Vital Signs Last 24 Hrs  T(C): 37.2 (13 May 2022 17:00), Max: 37.3 (13 May 2022 00:51)  T(F): 99 (13 May 2022 17:00), Max: 99.1 (13 May 2022 00:51)  HR: 107 (13 May 2022 17:00) (107 - 119)  BP: 123/82 (13 May 2022 17:00) (110/63 - 123/82)  BP(mean): --  RR: 18 (13 May 2022 17:00) (18 - 20)  SpO2: 97% (13 May 2022 17:00) (95% - 99%)    LUNGS:  clear to A/P  ABDOMIN: Soft,  appropriately tender, minimally distended. + BS, +BM  INCISION:  clean dry and intact  VAGINA:  minimal bleeding  EXTREMITIES: lower extremities symmetric and without calf tenderness slightly edematous    LABS:                        9.7    9.32  )-----------( 198      ( 13 May 2022 07:29 )             29.6     05-13    137  |  104  |  21  ----------------------------<  86  3.5   |  19<L>  |  0.58    Ca    8.6      13 May 2022 07:28  Phos  4.0     05-13  Mg     1.6     05-13    TPro  5.3<L>  /  Alb  2.4<L>  /  TBili  0.4  /  DBili  x   /  AST  13  /  ALT  7<L>  /  AlkPhos  131<H>  05-12      Allergies    amoxicillin (Other)    Intolerances        Assessment and Plan    Doing better   Ileus is resolving. Will keep NGT overnight and d/c in the am. NGT out put was 250ml at 1500 and 25 ml at 1900.   Afebrile, Blood cultures from 5/12 show no growth, Will continue with IV antibiotics and follow ID recommendations.  Routine post op care.   She is encouraged to ambulate and use incentive spirometry.

## 2022-05-13 NOTE — PROVIDER CONTACT NOTE (OTHER) - BACKGROUND
C/S day 1
c/s2 for breech post masimo
 PPD4 C-sec w/ illius w/ Ng Tube in place. Ng tube was clamped this afternoon and low intermitted suction restarted at 1900.

## 2022-05-13 NOTE — PROGRESS NOTE ADULT - ASSESSMENT
43y/o F now POD4 s/p pMTCS c/b postpartum endometritis, GNR bacteremia and now with development of SBO vs ileus. NGT in situ with continued bilious output. Overall stable vital signs overnight.     #SBO vs Ileus    - AM BMP Mg Phos    - NPO, LR@175cc/hr    - NGT set to low intermittent wall suction    - 1:1 repletion of NGT output    - Appreciate gen surg and ENT input     #GNR Bactremia    - BCx (5/11) growing GNR, Enterobacter/Klebsiella. Sensitivities pending.    - Continue Cefepime (5/12-)   - Repeat BCx pending (5/12)    - AM CBC+Diff pending    - Appreciate ID consult and recs     #Postop Care    - HSQ, Venodynes for DVT ppx    - NPO as above    - IV Toradol, Tylenol PRN for pain    - Encourage OOB   - VS monitoring per routine     Karis, PGY-3

## 2022-05-13 NOTE — CHART NOTE - NSCHARTNOTEFT_GEN_A_CORE
- Seen and examined.  - Abdomen benign, much less distended. Passing flatus.  - She refers feeling better just after NGT.  - Currently on NGT clamp trial.  - Ileus appears to be resolving.

## 2022-05-13 NOTE — PROVIDER CONTACT NOTE (OTHER) - SITUATION
Pt complaining of sharp pain in abdomen that comes and goes with spasms. Pt short of breath.
C/S day 3 with ileus, high BMI, and positive blood cultures. pt urine output 400cc over night
C/S day 1 for high BMI s/p gian. Pt reports having worsening pain in abdomen that has gotten worse through the day. repeated vitals as per MD request
C/S day 1. s/p gian for high BMI. pt has vertical incision with matt. Pt has been reporting pain in abdomen that has not been relieved with pain medications
Is tube NG tube coming out tonight?
pt c/o gas pain
C/S S/P gian for high BMI. C/S day 3. patient has a NG tube for an Ileus shown on a CT Scan. Pt called on call bell stating she felt like she was choking. RN at bedside NG Tube dislodged
C/S day 1 for high BMI s/p gian. Pt reports having worsening pain in abdomen that has gotten worse through the day. HR is elavated
C/S day 1 for high BMI s/p gian. Pt reports having worsening pain in abdomen that has gotten worse through the day. HR is elavated
C/S day 1 for high BMI s/p gian. Pt reports having worsening pain in abdomen that has gotten worse through the day. repeated vitals as per MD request
Patient c/o 10/10 pain after taking oxycodone 5mg at 1412 and oxycodone 5 mg breakthrough one hour later. Patient takes Tylenol and Motrin PO ATC

## 2022-05-13 NOTE — PROVIDER CONTACT NOTE (OTHER) - ASSESSMENT
pt last received IV tylenol and oxycodone 5mg PO at 9pm. Pt reports tenderness in abdomen that feel like "spasms" pt reports pain is worsening. oral temp 100.2F, , /77 resp 19, SPO2 97%. pt denies shortness of breath other than it is hard to take deep breaths because her abdomen hurts. pt denies chills at this time
pt states she passed gas this morning but is having severe spasms with gas ambulation encouraged this AM but pt refusing warm fluids pain meds and gum chewing encouraged  abdomen soft no bowel sounds
Pt reports she felt she was choking an NG tube dislodged from patient and lying on patient chest. Pt not vomiting at this time. At change of shift tubing verified with two RN's with no drainage in suction canister. MD Oliveira made aware that there was no drainage in canister but there was drainage in tubing at the time. Pt reporting no pain at this time. pt not choking anymore. Vitals - , Temp 99F, spo2 95%, respirations 22.
, respirations 22, rectal temp 100.1F, /76, spo2 97%. pt reports pain is still present in abdomen
VS stable but tachycardiac 114. Lungs sounds clear. Pt belching but reported she hasn't passed flatus.
urine output 400 cc over night. NG tube 800cc overnight. pt NPO over night. Pt urine output not adequate at this time
, respirations 26, rectal temp 100.9F, /70, spo2 96%. pt reports feeling slightly better
pt rectal temp at this time is 101.9F, pain is still present in abdomen 10/10
Ng output since 1900=25cc. Pt denies N/V. Larged formed BM today. +Bowel sounds. Pt reports feeling much better.
pt reports feeling pain in her abdomen that is gas that patient feels is just sitting trapped in her abdomen. Vitals initially taken temperature 38.3C pt reports falling asleep under blankets and long sleeve shirt and woke up hot. blankets taken off patient and 5 minutes later temperature is 36.8C afebrile. pt denies feeling symptoms of fevers. HR is 134 pt denies chest pain at this time. pt says she feels her heart rate is high from waking up hot and having pain in her abdomen. /61, resp 18, pulse ix 97%. pain 7-8/10 in her abdomen. Staples in place on incision with no drainage. Lung sounds clear. pt reports she has been burping more frequently which has slightly helped with pain
Patient has increase in pain level after 24 hours post op. Patient has ambulated in the hallway, increasing movement significantly today.

## 2022-05-13 NOTE — PROGRESS NOTE ADULT - SUBJECTIVE AND OBJECTIVE BOX
Follow Up: Bacteremia    Interval History/ROS: Still very tired and feels like she got hit by a truck. Afebrile. WBC down. NG tube placed for ileus/vomiting.     Allergies  amoxicillin (Other)        ANTIMICROBIALS:  cefepime   IVPB 2000 every 8 hours      OTHER MEDS:  acetaminophen   IVPB .. 1000 milliGRAM(s) IV Intermittent once  acetaminophen   IVPB .. 1000 milliGRAM(s) IV Intermittent once  diphtheria/tetanus/pertussis (acellular) Vaccine (ADAcel) 0.5 milliLiter(s) IntraMuscular once  famotidine Injectable 20 milliGRAM(s) IV Push once  heparin   Injectable 67754 Unit(s) SubCutaneous every 12 hours  ketorolac   Injectable 30 milliGRAM(s) IV Push every 6 hours  lactated ringers. 1000 milliLiter(s) IV Continuous <Continuous>  lanolin Ointment 1 Application(s) Topical every 6 hours PRN  morphine  - Injectable 2 milliGRAM(s) IV Push every 4 hours PRN  ondansetron Injectable 4 milliGRAM(s) IV Push every 8 hours PRN      Vital Signs Last 24 Hrs  T(C): 36.4 (13 May 2022 12:26), Max: 37.6 (12 May 2022 15:49)  T(F): 97.5 (13 May 2022 12:26), Max: 99.6 (12 May 2022 15:49)  HR: 107 (13 May 2022 12:26) (107 - 130)  BP: 114/81 (13 May 2022 12:26) (103/50 - 120/73)  BP(mean): 75 (12 May 2022 17:35) (69 - 81)  RR: 18 (13 May 2022 12:26) (18 - 26)  SpO2: 99% (13 May 2022 12:26) (95% - 99%)    Physical Exam:  General: non toxic but fatigue   ENT: NG tube   Cardio: regular rate   Respiratory: nonlabored on room air   abd: soft, sore   Skin: no rash                          9.7    9.32  )-----------( 198      ( 13 May 2022 07:29 )             29.6       05-13    137  |  104  |  21  ----------------------------<  86  3.5   |  19<L>  |  0.58    Ca    8.6      13 May 2022 07:28  Phos  4.0     05-13  Mg     1.6     05-13    TPro  5.3<L>  /  Alb  2.4<L>  /  TBili  0.4  /  DBili  x   /  AST  13  /  ALT  7<L>  /  AlkPhos  131<H>  05-12          MICROBIOLOGY:  Culture - Urine (collected 05-11-22 @ 00:54)  Source: Catheterized Catheterized  Final Report (05-11-22 @ 23:51):    No growth    Culture - Blood (collected 05-11-22 @ 00:26)  Source: .Blood Blood-Peripheral  Gram Stain (05-12-22 @ 09:51):    Growth in anaerobic bottle: Gram Negative Rods    Growth in aerobic bottle: Gram Negative Rods  Final Report (05-13-22 @ 10:32):    Growth in aerobic and anaerobic bottles: Klebsiella aerogenes    ***Blood Panel PCR results on this specimen are available    approximately 3 hours after the Gram stain result.***    Gram stain, PCR, and/or culture results may not always    correspond due to difference in methodologies.    ************************************************************    This PCR assay was performed by multiplex PCR. This    Assay tests for 66 bacterial and resistance gene targets.    Please refer to the [facility] test directory    at [email or web address] for details.  Organism: Blood Culture PCR  Enterobacter aerogenes (05-13-22 @ 10:32)  Organism: Enterobacter aerogenes (05-13-22 @ 10:32)      -  Amikacin: S <=16      -  Ampicillin: R 16 These ampicillin results predict results for amoxicillin      -  Ampicillin/Sulbactam: R <=4/2 Enterobacter, Klebsiella aerogenes, Citrobacter, and Serratia may develop resistance during prolonged therapy (3-4 days)      -  Aztreonam: S <=4      -  Cefazolin: R >16 Enterobacter, Klebsiella aerogenes, Citrobacter, and Serratia may develop resistance during prolonged therapy (3-4 days)      -  Cefepime: S <=2      -  Cefoxitin: R >16      -  Ceftriaxone: S <=1 Enterobacter, Klebsiella aerogenes, Citrobacter, and Serratia may develop resistance during prolonged therapy      -  Ciprofloxacin: S <=0.25      -  Ertapenem: S <=0.5      -  Gentamicin: S <=2      -  Imipenem: S <=1      -  Levofloxacin: S <=0.5      -  Meropenem: S <=1      -  Piperacillin/Tazobactam: S <=8      -  Tobramycin: S <=2      -  Trimethoprim/Sulfamethoxazole: S <=0.5/9.5      Method Type: BABAK  Organism: Blood Culture PCR (05-13-22 @ 10:32)      -  Enterobacter (non-cloacae complex): Detec      Method Type: PCR    Culture - Blood (collected 05-11-22 @ 00:26)  Source: .Blood Blood-Peripheral  Gram Stain (05-11-22 @ 17:33):    Growth in anaerobic bottle: Gram Negative Rods  Preliminary Report (05-12-22 @ 19:04):    Growth in anaerobic bottle: Klebsiella aerogenes    See previous culture [id number]    RADIOLOGY:  Images below reviewed personally  Xray Chest 1 View- PORTABLE-Urgent (Xray Chest 1 View- PORTABLE-Urgent .) (05.13.22 @ 00:38)   Enteric tube with side-port at the level of the GE junction.  New mildly prominent perihilar interstitial lung markings.

## 2022-05-13 NOTE — PROGRESS NOTE ADULT - SUBJECTIVE AND OBJECTIVE BOX
Section/Postpartum    KEY GARCIA is a  42y woman , who is now post-operative day: 4    PAST MEDICAL & SURGICAL HISTORY:  ANA (iron deficiency anemia)      Low back pain      History of ovarian cyst      History of endometriosis      Obesity  BMI 42      History of cardiac murmur      Benign cyst of skin  removal at age 16 from temple      History of myomectomy  2018      History of appendectomy  2019      History of tubal ligation  Excision [id number]          Subjective:  The patient feeling fatigued.   She is resting in bed with trips to the bathroom.  She is NPO with NGT in place  She denies nausea and vomiting.  She is voiding.  Her pain is controlled with IV tylenol and toradol  She reports normal postpartum bleeding    Physical exam:    presently afebrile    Vital Signs Last 24 Hrs   T(C): 36.7 (13 May 2022 08:55), Max: 37.6 (12 May 2022 09:49)  T(F): 98.1 (13 May 2022 08:55), Max: 99.7 (12 May 2022 09:49)  HR: 110 (13 May 2022 08:55) (110 - 136)  BP: 113/81 (13 May 2022 08:55) (103/50 - 150/70)  BP(mean): 75 (12 May 2022 17:35) (69 - 81)  RR: 18 (13 May 2022 08:55) (18 - 27)  SpO2: 96% (13 May 2022 08:55) (95% - 99%)    General: alert and oriented in no acute distress.  Breast: Soft, nontender, not engorged.  Abdomen: Soft, slightly tender in the epigastric area, slightly distended distended, minimal BS, She admits to passage of a small amount of flatus. No BM  Uterine fundus is firm and at below the umbilicus,   Incision: Clean, dry, and intact.   Pelvic: Normal lochia noted  Ext: No calf tenderness, edematous  Lochia: not excessive    LABS:                        9.7    9.32  )-----------( 198      ( 13 May 2022 07:29 )             29.6                         Allergies    amoxicillin (Other)-rash    Intolerances        MEDICATIONS  (STANDING):  acetaminophen   IVPB .. 1000 milliGRAM(s) IV Intermittent once  acetaminophen   IVPB .. 1000 milliGRAM(s) IV Intermittent once  acetaminophen   IVPB .. 1000 milliGRAM(s) IV Intermittent once  cefepime   IVPB 2000 milliGRAM(s) IV Intermittent every 8 hours  diphtheria/tetanus/pertussis (acellular) Vaccine (ADAcel) 0.5 milliLiter(s) IntraMuscular once  famotidine Injectable 20 milliGRAM(s) IV Push once  heparin   Injectable 99859 Unit(s) SubCutaneous every 12 hours  ketorolac   Injectable 30 milliGRAM(s) IV Push every 6 hours  lactated ringers. 1000 milliLiter(s) (175 mL/Hr) IV Continuous <Continuous>    MEDICATIONS  (PRN):  lanolin Ointment 1 Application(s) Topical every 6 hours PRN Sore Nipples  morphine  - Injectable 2 milliGRAM(s) IV Push every 4 hours PRN Moderate Pain (4 - 6)  ondansetron Injectable 4 milliGRAM(s) IV Push every 8 hours PRN Nausea and/or Vomiting        Assessment and Plan:    POD # 4 s/p  PCS with postop small bowel ileus       Improving  Encourage ambulation and use of incentive spriometry.

## 2022-05-13 NOTE — PROVIDER CONTACT NOTE (OTHER) - REASON
pain unrelieved with prescribed medications
Pain and SOB
/pain
NG placement out of patient
Vital signs
rectal temp 101.9F
repeat vitals
repeat vitals
urine output low
Discuss plan for Ng Tube overnight
pt c/o severe gas pain

## 2022-05-13 NOTE — PROVIDER CONTACT NOTE (OTHER) - NAME OF MD/NP/PA/DO NOTIFIED:
Dr Bryant
Dr Bryant
Dr Luis
MD Garsia (trauma # [id number])
Dr Bryant
Dr Bryant
Dr Oliveira
Dr Oliveira
Dr. Bryant
Dr Koehler
Kacey FREED

## 2022-05-13 NOTE — PROVIDER CONTACT NOTE (OTHER) - ACTION/TREATMENT ORDERED:
800 cc bolus normal saline ordered
IV tylenol and PO oxycodone  given NOW for pain control  IV torodol ordered when due  PO simethacone given to patient for gas pain pt educated to ambulate   pt vital signs to be repeated in 2 hours
MD Dotson aware of recent vitals  repeat rectal temp/v/s in one hour
MD Dotson aware of rectal temp   MD came to bedside to assess patient   repeat rectal temp/v/s in one hour  antibiotics ordered , stat blood/urine labs sent  Lactated ringers bolus x3  EKG completed
Plan discussed w/ Dr Oliveira.
will continue to monitor and observe
MD Dotson aware of recent vitals  repeat rectal temp/v/s in four hours
MD Dotson aware of vital signs  rectal temp to be taken  stat EKG ordered   MD coming to assess patient at bedside
MD Oliveira made aware of NG tube coming out of patient  MD Oliveira to come to patient bedside  MD Oliveira aware of heart rate, patient has been running tachy
Toradol x 1 dose
Provider to come assess patient.

## 2022-05-13 NOTE — PROVIDER CONTACT NOTE (OTHER) - DATE AND TIME:
11-May-2022 02:10
12-May-2022 20:10
10-May-2022 23:20
13-May-2022 20:30
10-May-2022 17:08
10-May-2022 23:45
11-May-2022 00:50
11-May-2022 20:45
13-May-2022 06:24
10-May-2022 21:10
11-May-2022 12:30

## 2022-05-13 NOTE — PROGRESS NOTE ADULT - SUBJECTIVE AND OBJECTIVE BOX
OBGyn Progress Note     Subjective:   Pt seen and examined at bedside. NGT dislodged and replaced overnight, ~500cc bilious output. Pain well controlled. Patient ambulating. Tolerating regular diet. Pt denies fever, chills, chest pain, SOB, nausea, vomiting, lightheadedness, dizziness.        MEDICATIONS  (STANDING):  cefepime   IVPB 2000 milliGRAM(s) IV Intermittent every 8 hours  diphtheria/tetanus/pertussis (acellular) Vaccine (ADAcel) 0.5 milliLiter(s) IntraMuscular once  famotidine Injectable 20 milliGRAM(s) IV Push once  heparin   Injectable 18240 Unit(s) SubCutaneous every 12 hours  ketorolac   Injectable 30 milliGRAM(s) IV Push every 6 hours  lactated ringers. 1000 milliLiter(s) (175 mL/Hr) IV Continuous <Continuous>    MEDICATIONS  (PRN):  lanolin Ointment 1 Application(s) Topical every 6 hours PRN Sore Nipples  morphine  - Injectable 2 milliGRAM(s) IV Push every 4 hours PRN Moderate Pain (4 - 6)  ondansetron Injectable 4 milliGRAM(s) IV Push every 8 hours PRN Nausea and/or Vomiting      T(F): 99.1 (05-13-22 @ 00:51), Max: 101.1 (05-12-22 @ 07:55)  HR: 119 (05-13-22 @ 00:51) (110 - 136)  BP: 110/63 (05-13-22 @ 00:51) (103/50 - 150/70)  RR: 20 (05-13-22 @ 00:51) (19 - 27)  SpO2: 95% (05-13-22 @ 00:51) (95% - 99%)    Physical Exam:  Constitutional: A+Ox3, uncomfortable appearing  CV: RRR  Lungs: Clear to auscultation bilaterally  Abdomen: Soft, nondistended, no guarding or rebound tenderness. Hypoactive bowel sounds.   Incision: Clean, dry, intact  Extremities: 1+ edema bilaterally, symmetric. Venodynes in place    LABS:  05-12    138    |  104    |  16     ----------------------------<  95     3.8     |  20<L>  |  0.57   05-12    135    |  103    |  12     ----------------------------<  111<H>  4.0     |  21<L>  |  0.64   05-11    136    |  105    |  10     ----------------------------<  114<H>  3.9     |  19<L>  |  0.55     Ca    8.8        12 May 2022 16:25  Ca    9.0        12 May 2022 07:04  Ca    8.8        11 May 2022 22:31    TPro  5.3<L>  /  Alb  2.4<L>  /  TBili  0.4    /  DBili  x      /  AST  13     /  ALT  7<L>   /  AlkPhos  131<H>  05-12  TPro  5.8<L>  /  Alb  2.8<L>  /  TBili  0.5    /  DBili  x      /  AST  15     /  ALT  7<L>   /  AlkPhos  149<H>  05-12  TPro  5.5<L>  /  Alb  2.8<L>  /  TBili  0.5    /  DBili  0.1    /  AST  17     /  ALT  8<L>   /  AlkPhos  143<H>  05-11    PT/INR - ( 12 May 2022 07:08 )   PT: 15.2 sec;   INR: 1.31 ratio    PTT - ( 12 May 2022 07:08 )  PTT:29.8 sec      I&O's Summary  11 May 2022 07:01  -  12 May 2022 07:00  --------------------------------------------------------  IN: 775 mL / OUT: 1200 mL / NET: -425 mL    12 May 2022 07:01  -  13 May 2022 05:20  --------------------------------------------------------  IN: 4400 mL / OUT: 1675 mL / NET: 2725 mL OBGyn Progress Note     Subjective:   Pt seen and examined at bedside. NGT dislodged and replaced overnight, ~500cc bilious output. Pain well controlled. Patient ambulating. Tolerating regular diet. Pt denies fever, chills, chest pain, SOB, nausea, vomiting, lightheadedness, dizziness.        MEDICATIONS  (STANDING):  cefepime   IVPB 2000 milliGRAM(s) IV Intermittent every 8 hours  diphtheria/tetanus/pertussis (acellular) Vaccine (ADAcel) 0.5 milliLiter(s) IntraMuscular once  famotidine Injectable 20 milliGRAM(s) IV Push once  heparin   Injectable 81614 Unit(s) SubCutaneous every 12 hours  ketorolac   Injectable 30 milliGRAM(s) IV Push every 6 hours  lactated ringers. 1000 milliLiter(s) (175 mL/Hr) IV Continuous <Continuous>    MEDICATIONS  (PRN):  lanolin Ointment 1 Application(s) Topical every 6 hours PRN Sore Nipples  morphine  - Injectable 2 milliGRAM(s) IV Push every 4 hours PRN Moderate Pain (4 - 6)  ondansetron Injectable 4 milliGRAM(s) IV Push every 8 hours PRN Nausea and/or Vomiting      T(F): 99.1 (05-13-22 @ 00:51), Max: 101.1 (05-12-22 @ 07:55)  HR: 119 (05-13-22 @ 00:51) (110 - 136)  BP: 110/63 (05-13-22 @ 00:51) (103/50 - 150/70)  RR: 20 (05-13-22 @ 00:51) (19 - 27)  SpO2: 95% (05-13-22 @ 00:51) (95% - 99%)    Physical Exam:  Constitutional: A+Ox3, uncomfortable. NGT in situ, bilious output.   CV: RRR  Lungs: Clear to auscultation bilaterally  Abdomen: Soft, nondistended, no guarding or rebound tenderness. Hypoactive bowel sounds.   Incision: Clean, dry, intact  Extremities: 1+ edema bilaterally, symmetric. Venodynes in place    LABS:  05-12    138    |  104    |  16     ----------------------------<  95     3.8     |  20<L>  |  0.57   05-12    135    |  103    |  12     ----------------------------<  111<H>  4.0     |  21<L>  |  0.64   05-11    136    |  105    |  10     ----------------------------<  114<H>  3.9     |  19<L>  |  0.55     Ca    8.8        12 May 2022 16:25  Ca    9.0        12 May 2022 07:04  Ca    8.8        11 May 2022 22:31    TPro  5.3<L>  /  Alb  2.4<L>  /  TBili  0.4    /  DBili  x      /  AST  13     /  ALT  7<L>   /  AlkPhos  131<H>  05-12  TPro  5.8<L>  /  Alb  2.8<L>  /  TBili  0.5    /  DBili  x      /  AST  15     /  ALT  7<L>   /  AlkPhos  149<H>  05-12  TPro  5.5<L>  /  Alb  2.8<L>  /  TBili  0.5    /  DBili  0.1    /  AST  17     /  ALT  8<L>   /  AlkPhos  143<H>  05-11    PT/INR - ( 12 May 2022 07:08 )   PT: 15.2 sec;   INR: 1.31 ratio    PTT - ( 12 May 2022 07:08 )  PTT:29.8 sec      I&O's Summary  11 May 2022 07:01  -  12 May 2022 07:00  --------------------------------------------------------  IN: 775 mL / OUT: 1200 mL / NET: -425 mL    12 May 2022 07:01  -  13 May 2022 05:20  --------------------------------------------------------  IN: 4400 mL / OUT: 1675 mL / NET: 2725 mL

## 2022-05-14 LAB
ALBUMIN SERPL ELPH-MCNC: 2 G/DL — LOW (ref 3.3–5)
ALP SERPL-CCNC: 122 U/L — HIGH (ref 40–120)
ALT FLD-CCNC: 8 U/L — LOW (ref 10–45)
ANION GAP SERPL CALC-SCNC: 13 MMOL/L — SIGNIFICANT CHANGE UP (ref 5–17)
AST SERPL-CCNC: 13 U/L — SIGNIFICANT CHANGE UP (ref 10–40)
BILIRUB SERPL-MCNC: 0.3 MG/DL — SIGNIFICANT CHANGE UP (ref 0.2–1.2)
BUN SERPL-MCNC: 22 MG/DL — SIGNIFICANT CHANGE UP (ref 7–23)
CALCIUM SERPL-MCNC: 8.7 MG/DL — SIGNIFICANT CHANGE UP (ref 8.4–10.5)
CHLORIDE SERPL-SCNC: 104 MMOL/L — SIGNIFICANT CHANGE UP (ref 96–108)
CO2 SERPL-SCNC: 18 MMOL/L — LOW (ref 22–31)
CREAT SERPL-MCNC: 0.49 MG/DL — LOW (ref 0.5–1.3)
EGFR: 121 ML/MIN/1.73M2 — SIGNIFICANT CHANGE UP
GLUCOSE SERPL-MCNC: 62 MG/DL — LOW (ref 70–99)
HCT VFR BLD CALC: 28.8 % — LOW (ref 34.5–45)
HCT VFR BLD CALC: 29 % — LOW (ref 34.5–45)
HGB BLD-MCNC: 9.4 G/DL — LOW (ref 11.5–15.5)
HGB BLD-MCNC: 9.8 G/DL — LOW (ref 11.5–15.5)
MAGNESIUM SERPL-MCNC: 2 MG/DL — SIGNIFICANT CHANGE UP (ref 1.6–2.6)
MCHC RBC-ENTMCNC: 32 PG — SIGNIFICANT CHANGE UP (ref 27–34)
MCHC RBC-ENTMCNC: 32.4 GM/DL — SIGNIFICANT CHANGE UP (ref 32–36)
MCHC RBC-ENTMCNC: 32.5 PG — SIGNIFICANT CHANGE UP (ref 27–34)
MCHC RBC-ENTMCNC: 34 GM/DL — SIGNIFICANT CHANGE UP (ref 32–36)
MCV RBC AUTO: 95.4 FL — SIGNIFICANT CHANGE UP (ref 80–100)
MCV RBC AUTO: 98.6 FL — SIGNIFICANT CHANGE UP (ref 80–100)
NRBC # BLD: 0 /100 WBCS — SIGNIFICANT CHANGE UP (ref 0–0)
NRBC # BLD: 0 /100 WBCS — SIGNIFICANT CHANGE UP (ref 0–0)
PHOSPHATE SERPL-MCNC: 4.1 MG/DL — SIGNIFICANT CHANGE UP (ref 2.5–4.5)
PLATELET # BLD AUTO: 199 K/UL — SIGNIFICANT CHANGE UP (ref 150–400)
PLATELET # BLD AUTO: 247 K/UL — SIGNIFICANT CHANGE UP (ref 150–400)
POTASSIUM SERPL-MCNC: 3.8 MMOL/L — SIGNIFICANT CHANGE UP (ref 3.5–5.3)
POTASSIUM SERPL-SCNC: 3.8 MMOL/L — SIGNIFICANT CHANGE UP (ref 3.5–5.3)
PROT SERPL-MCNC: 5.1 G/DL — LOW (ref 6–8.3)
RAPID RVP RESULT: SIGNIFICANT CHANGE UP
RBC # BLD: 2.94 M/UL — LOW (ref 3.8–5.2)
RBC # BLD: 3.02 M/UL — LOW (ref 3.8–5.2)
RBC # FLD: 13.5 % — SIGNIFICANT CHANGE UP (ref 10.3–14.5)
RBC # FLD: 13.7 % — SIGNIFICANT CHANGE UP (ref 10.3–14.5)
SARS-COV-2 RNA SPEC QL NAA+PROBE: SIGNIFICANT CHANGE UP
SODIUM SERPL-SCNC: 135 MMOL/L — SIGNIFICANT CHANGE UP (ref 135–145)
WBC # BLD: 6.94 K/UL — SIGNIFICANT CHANGE UP (ref 3.8–10.5)
WBC # BLD: 7.24 K/UL — SIGNIFICANT CHANGE UP (ref 3.8–10.5)
WBC # FLD AUTO: 6.94 K/UL — SIGNIFICANT CHANGE UP (ref 3.8–10.5)
WBC # FLD AUTO: 7.24 K/UL — SIGNIFICANT CHANGE UP (ref 3.8–10.5)

## 2022-05-14 PROCEDURE — 71046 X-RAY EXAM CHEST 2 VIEWS: CPT | Mod: 26

## 2022-05-14 PROCEDURE — 99232 SBSQ HOSP IP/OBS MODERATE 35: CPT

## 2022-05-14 RX ORDER — ACETAMINOPHEN 500 MG
1000 TABLET ORAL ONCE
Refills: 0 | Status: COMPLETED | OUTPATIENT
Start: 2022-05-14 | End: 2022-05-14

## 2022-05-14 RX ORDER — MOXIFLOXACIN HYDROCHLORIDE TABLETS, 400 MG 400 MG/1
1 TABLET, FILM COATED ORAL
Qty: 20 | Refills: 0
Start: 2022-05-14 | End: 2022-05-23

## 2022-05-14 RX ORDER — IBUPROFEN 200 MG
600 TABLET ORAL EVERY 6 HOURS
Refills: 0 | Status: DISCONTINUED | OUTPATIENT
Start: 2022-05-14 | End: 2022-05-16

## 2022-05-14 RX ORDER — ACETAMINOPHEN 500 MG
975 TABLET ORAL EVERY 6 HOURS
Refills: 0 | Status: DISCONTINUED | OUTPATIENT
Start: 2022-05-14 | End: 2022-05-16

## 2022-05-14 RX ORDER — SODIUM CHLORIDE 9 MG/ML
3 INJECTION INTRAMUSCULAR; INTRAVENOUS; SUBCUTANEOUS EVERY 8 HOURS
Refills: 0 | Status: DISCONTINUED | OUTPATIENT
Start: 2022-05-14 | End: 2022-05-16

## 2022-05-14 RX ADMIN — Medication 975 MILLIGRAM(S): at 16:09

## 2022-05-14 RX ADMIN — CEFEPIME 100 MILLIGRAM(S): 1 INJECTION, POWDER, FOR SOLUTION INTRAMUSCULAR; INTRAVENOUS at 05:28

## 2022-05-14 RX ADMIN — Medication 30 MILLIGRAM(S): at 12:30

## 2022-05-14 RX ADMIN — Medication 1 MILLIGRAM(S): at 20:16

## 2022-05-14 RX ADMIN — Medication 30 MILLIGRAM(S): at 05:28

## 2022-05-14 RX ADMIN — Medication 600 MILLIGRAM(S): at 22:00

## 2022-05-14 RX ADMIN — Medication 975 MILLIGRAM(S): at 23:37

## 2022-05-14 RX ADMIN — Medication 1000 MILLIGRAM(S): at 10:30

## 2022-05-14 RX ADMIN — CEFEPIME 100 MILLIGRAM(S): 1 INJECTION, POWDER, FOR SOLUTION INTRAMUSCULAR; INTRAVENOUS at 21:59

## 2022-05-14 RX ADMIN — HEPARIN SODIUM 10000 UNIT(S): 5000 INJECTION INTRAVENOUS; SUBCUTANEOUS at 18:46

## 2022-05-14 RX ADMIN — Medication 975 MILLIGRAM(S): at 16:50

## 2022-05-14 RX ADMIN — Medication 600 MILLIGRAM(S): at 21:05

## 2022-05-14 RX ADMIN — SODIUM CHLORIDE 175 MILLILITER(S): 9 INJECTION, SOLUTION INTRAVENOUS at 12:03

## 2022-05-14 RX ADMIN — SODIUM CHLORIDE 3 MILLILITER(S): 9 INJECTION INTRAMUSCULAR; INTRAVENOUS; SUBCUTANEOUS at 20:00

## 2022-05-14 RX ADMIN — Medication 30 MILLIGRAM(S): at 12:03

## 2022-05-14 RX ADMIN — CEFEPIME 100 MILLIGRAM(S): 1 INJECTION, POWDER, FOR SOLUTION INTRAMUSCULAR; INTRAVENOUS at 13:53

## 2022-05-14 RX ADMIN — HEPARIN SODIUM 10000 UNIT(S): 5000 INJECTION INTRAVENOUS; SUBCUTANEOUS at 05:28

## 2022-05-14 RX ADMIN — Medication 400 MILLIGRAM(S): at 10:05

## 2022-05-14 RX ADMIN — Medication 400 MILLIGRAM(S): at 03:37

## 2022-05-14 NOTE — PROGRESS NOTE ADULT - ASSESSMENT
43y/o F now POD5 s/p pMTCS c/b postpartum endometritis, GNR bacteremia and now with development of SBO vs ileus. NGT in situ with continued bilious output. Overall stable vital signs overnight.     #SBO    - NPO, LR@175cc/hr    - AM BMP Mg Phos    - Replete electrolytes PRN    - NGT set to low intermittent wall suction.          - Anticipate clamp trial +/- removal this morning, per gen surg.          - 1:1 repletion of NGT output    - Appreciate gen surg and ENT input     #GNR Bacteremia    - AM CBC+Diff pending   - Continue Cefepime (5/12-)   - BCx (5/11) with GNR, Enterobacter/Klebsiella.    - Repeat BCX (5/12) prelim NGTD    - Appreciate ID consult and recs for discharge home with PO Cipro 500mg BID (no breastfeeding on Cipro until 2d after last dose)     #Postop Care    - HSQ, Venodynes for DVT ppx    - NPO as above    - IV Toradol, Tylenol PRN for pain    - Encourage OOB   - VS monitoring per routine     Karis, PGY-3

## 2022-05-14 NOTE — PROGRESS NOTE ADULT - SUBJECTIVE AND OBJECTIVE BOX
TEAM Surgery Progress Note    INTERVAL EVENTS: Had a BM in the morning  SUBJECTIVE: Patient seen and examined at bedside with surgical team, says she is passing gas and stool. NGT was clamped and residual output was less than 50cc after 4 hours. NGT removed at bedside.     OBJECTIVE:    Vital Signs Last 24 Hrs  T(C): 37.1 (14 May 2022 10:33), Max: 37.3 (14 May 2022 00:39)  T(F): 98.7 (14 May 2022 10:33), Max: 99.1 (14 May 2022 00:39)  HR: 94 (14 May 2022 10:33) (90 - 107)  BP: 129/83 (14 May 2022 10:33) (110/76 - 131/87)  BP(mean): --  RR: 18 (14 May 2022 10:33) (18 - 18)  SpO2: 97% (14 May 2022 10:33) (95% - 99%)I&O's Detail    13 May 2022 07:01  -  14 May 2022 07:00  --------------------------------------------------------  IN:    Lactated Ringers: 1750 mL  Total IN: 1750 mL    OUT:    Nasogastric/Oral tube (mL): 425 mL    Voided (mL): 1100 mL  Total OUT: 1525 mL    Total NET: 225 mL      MEDICATIONS  (STANDING):  cefepime   IVPB 2000 milliGRAM(s) IV Intermittent every 8 hours  diphtheria/tetanus/pertussis (acellular) Vaccine (ADAcel) 0.5 milliLiter(s) IntraMuscular once  famotidine Injectable 20 milliGRAM(s) IV Push once  heparin   Injectable 09535 Unit(s) SubCutaneous every 12 hours  ketorolac   Injectable 30 milliGRAM(s) IV Push every 6 hours  lactated ringers. 1000 milliLiter(s) (175 mL/Hr) IV Continuous <Continuous>    MEDICATIONS  (PRN):  lanolin Ointment 1 Application(s) Topical every 6 hours PRN Sore Nipples  LORazepam   Injectable 1 milliGRAM(s) IV Push every 6 hours PRN Anxiety  morphine  - Injectable 2 milliGRAM(s) IV Push every 4 hours PRN Moderate Pain (4 - 6)  ondansetron Injectable 4 milliGRAM(s) IV Push every 8 hours PRN Nausea and/or Vomiting      PHYSICAL EXAM:  Constitutional: A&Ox3, NAD  Respiratory: Unlabored breathing  Abdomen: Soft, distended (post pregnancy), NTTP. No rebound or guarding.  Extremities: WWP, MALDONADO spontaneously    LABS:                        9.4    6.94  )-----------( 199      ( 14 May 2022 07:13 )             29.0     05-14    135  |  104  |  22  ----------------------------<  62<L>  3.8   |  18<L>  |  0.49<L>    Ca    8.7      14 May 2022 07:04  Phos  4.1     05-14  Mg     2.0     05-14    TPro  5.1<L>  /  Alb  2.0<L>  /  TBili  0.3  /  DBili  x   /  AST  13  /  ALT  8<L>  /  AlkPhos  122<H>  05-14      LIVER FUNCTIONS - ( 14 May 2022 07:04 )  Alb: 2.0 g/dL / Pro: 5.1 g/dL / ALK PHOS: 122 U/L / ALT: 8 U/L / AST: 13 U/L / GGT: x                 IMAGING:

## 2022-05-14 NOTE — PROVIDER CONTACT NOTE (CHANGE IN STATUS NOTIFICATION) - BACKGROUND
C/S day 5 endometritis on IV antibiotics covered by infections disease. S/P NG tube for and ileus and tolerating clear liquid diet.

## 2022-05-14 NOTE — PROGRESS NOTE ADULT - SUBJECTIVE AND OBJECTIVE BOX
OBGYN Progress Note     Pt seen and examined at bedside.   Denies nausea or emesis overnight.  Passing flatus. Passed a large formed bowel movement. Pain well controlled. Ambulating.   Denies fever, chills, chest pain, SOB, nausea, vomiting, lightheadedness, dizziness.        MEDICATIONS  (STANDING):  cefepime   IVPB 2000 milliGRAM(s) IV Intermittent every 8 hours  diphtheria/tetanus/pertussis (acellular) Vaccine (ADAcel) 0.5 milliLiter(s) IntraMuscular once  famotidine Injectable 20 milliGRAM(s) IV Push once  heparin   Injectable 06323 Unit(s) SubCutaneous every 12 hours  ketorolac   Injectable 30 milliGRAM(s) IV Push every 6 hours  lactated ringers. 1000 milliLiter(s) (175 mL/Hr) IV Continuous <Continuous>    MEDICATIONS  (PRN):  lanolin Ointment 1 Application(s) Topical every 6 hours PRN Sore Nipples  LORazepam   Injectable 1 milliGRAM(s) IV Push every 6 hours PRN Anxiety  morphine  - Injectable 2 milliGRAM(s) IV Push every 4 hours PRN Moderate Pain (4 - 6)  ondansetron Injectable 4 milliGRAM(s) IV Push every 8 hours PRN Nausea and/or Vomiting      T(F): 99.1 (05-14-22 @ 00:39), Max: 99.1 (05-14-22 @ 00:39)  HR: 100 (05-14-22 @ 00:39) (90 - 117)  BP: 110/76 (05-14-22 @ 00:39) (110/76 - 131/87)  RR: 18 (05-14-22 @ 00:39) (18 - 18)  SpO2: 95% (05-14-22 @ 00:39) (95% - 99%)      Physical Exam:  Constitutional: NAD, A+O x3. NGT in situ, bilious output.   CV: RRR  Lungs: Clear to auscultation bilaterally  Abdomen: Soft, nondistended, no guarding or rebound tenderness. Normal bowel sounds  Incision: Midline vertical incision c/d/i   : No bleeding on pad  Extremities: No lower extremity edema or calf tenderness bilaterally; Venodynes in place      LABS:  05-13    137    |  104    |  21     ----------------------------<  86     3.5     |  19<L>  |  0.58   05-12    138    |  104    |  16     ----------------------------<  95     3.8     |  20<L>  |  0.57     Ca    8.6        13 May 2022 07:28  Ca    8.8        12 May 2022 16:25  Phos  4.0       05-13  Mg     1.6       05-13    TPro  5.3<L>  /  Alb  2.4<L>  /  TBili  0.4    /  DBili  x      /  AST  13     /  ALT  7<L>   /  AlkPhos  131<H>  05-12    PT/INR - ( 12 May 2022 07:08 )   PT: 15.2 sec;   INR: 1.31 ratio    PTT - ( 12 May 2022 07:08 )  PTT:29.8 sec      I&O's Summary    12 May 2022 07:01  -  13 May 2022 07:00  --------------------------------------------------------  IN: 4750 mL / OUT: 2625 mL / NET: 2125 mL    13 May 2022 07:01  -  14 May 2022 03:55  --------------------------------------------------------  IN: 1750 mL / OUT: 1525 mL / NET: 225 mL

## 2022-05-14 NOTE — PROGRESS NOTE ADULT - ASSESSMENT
41y/o F now POD5 s/p pMTCS c/b postpartum endometritis, GNR bacteremia and now with development of SBO vs ileus. NGT in situ with continued bilious output.     PLAN  - SBO resolved. Patient passed NGT clamp trial; NGT removed  - Can go slow and advance diet as tolerated  - Care per primary team

## 2022-05-14 NOTE — CHART NOTE - NSCHARTNOTESELECT_GEN_ALL_CORE
ENT/Event Note
Eval Note
Event Note
bedside evaluation/Event Note
Abd Pain-Bilious Vomiting
Assessment for Abd Pain/Fevers
Event Note

## 2022-05-14 NOTE — CHART NOTE - NSCHARTNOTEFT_GEN_A_CORE
R3 Note    Notified by RN of knew vital sign of fever to 38.3C.  Patient seen at bedside, overall feeling well.  Reports she has felt so much better this afternoon since removal of NGT.  She is tolerating clear liquids and has good appetite.  Passed a bowel movement last night and this am, currently passing flatus. Denies nausea or vomiting.  Patient subjectively did not feel she had a temperature.    Vital Signs Last 24 Hrs  T(C): 36.4 (14 May 2022 14:05), Max: 37.3 (14 May 2022 00:39)  T(F): 97.5 (14 May 2022 14:05), Max: 99.1 (14 May 2022 00:39)  HR: 88 (14 May 2022 14:05) (88 - 100)  BP: 125/84 (14 May 2022 14:05) (110/76 - 131/87)  BP(mean): --  RR: 18 (14 May 2022 14:05) (18 - 18)  SpO2: 100% (14 May 2022 14:05) (95% - 100%)    Gen: NAD  Abd: soft, nontender, nondistended  Incision: midline vertical incision w/ staples in place, clean/dry/intact    A/P:   39 yo  POD#5 s/p pMTCS c/w endometritis and likely SBO requiring NGT, removed earlier today after successful clamp trial, with new febrile episode    -CBC, repeat BCx  -ID notified  -tylenol for fever control  -c/w IV hydration    d/w Dr. Jonh Meade PGY3 R3 Note    Notified by RN of knew vital sign of fever to 38.3C.  Patient seen at bedside, overall feeling well.  Reports she has felt so much better this afternoon since removal of NGT.  She is tolerating clear liquids and has good appetite.  Passed a bowel movement last night and this am, currently passing flatus. Denies nausea or vomiting.  Patient subjectively did not feel she had a temperature.    Vital Signs Last 24 Hrs  T(C): 36.4 (14 May 2022 14:05), Max: 37.3 (14 May 2022 00:39)  T(F): 97.5 (14 May 2022 14:05), Max: 99.1 (14 May 2022 00:39)  HR: 88 (14 May 2022 14:05) (88 - 100)  BP: 125/84 (14 May 2022 14:05) (110/76 - 131/87)  BP(mean): --  RR: 18 (14 May 2022 14:05) (18 - 18)  SpO2: 100% (14 May 2022 14:05) (95% - 100%)    Gen: NAD  Abd: soft, nontender, nondistended  Incision: midline vertical incision w/ staples in place, clean/dry/intact    A/P:   41 yo  POD#5 s/p pMTCS c/w endometritis and likely SBO requiring NGT, removed earlier today after successful clamp trial, with new febrile episode    -CBC, repeat BCx  -ID notified, recommend continuing with cefepime  -CXR AP + lateral  -RVP  -tylenol for fever control  -c/w IV hydration    d/w Dr. Jonh Meade PGY3

## 2022-05-14 NOTE — PROGRESS NOTE ADULT - SUBJECTIVE AND OBJECTIVE BOX
Section/Postpartum    KEY GARCIA is a  42y woman  , who is now post-operative day: 5    PAST MEDICAL & SURGICAL HISTORY:  ANA (iron deficiency anemia)      Low back pain      History of ovarian cyst      History of endometriosis      Obesity  BMI 42      History of cardiac murmur      Benign cyst of skin  removal at age 16 from temple      History of myomectomy  2018      History of appendectomy  2019      History of tubal ligation  Excision [id number]          Subjective:  The patient feeling better. NGT has been d/c'd.  She is ambulating and using incentive spirometry  She is tolerating liquid diet.  She denies nausea and vomiting.  She is voiding.  Her pain is controlled.  She reports normal postpartum bleeding.  She is formula feeding.    Physical exam:    Vital Signs Last 24 Hrs  T(C): 36.4 (14 May 2022 14:05), Max: 37.3 (14 May 2022 00:39)  T(F): 97.5 (14 May 2022 14:05), Max: 99.1 (14 May 2022 00:39)  HR: 88 (14 May 2022 14:05) (88 - 107)  BP: 125/84 (14 May 2022 14:05) (110/76 - 131/87)  BP(mean): --  RR: 18 (14 May 2022 14:05) (18 - 18)  SpO2: 100% (14 May 2022 14:05) (95% - 100%)    General: alert and oriented in no acute distress.  Breast: Soft, nontender, not engorged.  Abdomen: Soft, nontender, not distended ,  Uterine fundus is firm and at below the umbilicus, BS (+), Flatus (+), Bowel Movement (+)  Incision: Clean, dry, and intact, bandage has been removed  Pelvic: Normal lochia noted  Ext: No calf tenderness, slightly edematous      LABS:                        9.4    6.94  )-----------( 199      ( 14 May 2022 07:13 )             29.0       Rubella status:  Immune    Blood Type:   ABO Interpretation: A   Rh Interpretation: Positive   Antibody Screen: Negative                    Allergies    amoxicillin (Other)    Intolerances        MEDICATIONS  (STANDING):  acetaminophen     Tablet .. 975 milliGRAM(s) Oral every 6 hours  cefepime   IVPB 2000 milliGRAM(s) IV Intermittent every 8 hours  diphtheria/tetanus/pertussis (acellular) Vaccine (ADAcel) 0.5 milliLiter(s) IntraMuscular once  famotidine Injectable 20 milliGRAM(s) IV Push once  heparin   Injectable 71844 Unit(s) SubCutaneous every 12 hours  ibuprofen  Tablet. 600 milliGRAM(s) Oral every 6 hours  sodium chloride 0.9% lock flush 3 milliLiter(s) IV Push every 8 hours    MEDICATIONS  (PRN):  lanolin Ointment 1 Application(s) Topical every 6 hours PRN Sore Nipples  LORazepam   Injectable 1 milliGRAM(s) IV Push every 6 hours PRN Anxiety  morphine  - Injectable 2 milliGRAM(s) IV Push every 4 hours PRN Moderate Pain (4 - 6)  ondansetron Injectable 4 milliGRAM(s) IV Push every 8 hours PRN Nausea and/or Vomiting        Assessment and Plan:    POD # 5  s/p  PCS complicated with SBO       Doing well.  Encourage ambulation, may shower, routine postop care.  advance diet slowly

## 2022-05-15 LAB
ANION GAP SERPL CALC-SCNC: 12 MMOL/L — SIGNIFICANT CHANGE UP (ref 5–17)
BUN SERPL-MCNC: 12 MG/DL — SIGNIFICANT CHANGE UP (ref 7–23)
C DIFF GDH STL QL: NEGATIVE — SIGNIFICANT CHANGE UP
C DIFF GDH STL QL: SIGNIFICANT CHANGE UP
CALCIUM SERPL-MCNC: 8.5 MG/DL — SIGNIFICANT CHANGE UP (ref 8.4–10.5)
CHLORIDE SERPL-SCNC: 106 MMOL/L — SIGNIFICANT CHANGE UP (ref 96–108)
CO2 SERPL-SCNC: 21 MMOL/L — LOW (ref 22–31)
CREAT SERPL-MCNC: 0.46 MG/DL — LOW (ref 0.5–1.3)
EGFR: 123 ML/MIN/1.73M2 — SIGNIFICANT CHANGE UP
GLUCOSE SERPL-MCNC: 95 MG/DL — SIGNIFICANT CHANGE UP (ref 70–99)
HCT VFR BLD CALC: 30.5 % — LOW (ref 34.5–45)
HGB BLD-MCNC: 10.3 G/DL — LOW (ref 11.5–15.5)
MAGNESIUM SERPL-MCNC: 1.8 MG/DL — SIGNIFICANT CHANGE UP (ref 1.6–2.6)
MCHC RBC-ENTMCNC: 31.9 PG — SIGNIFICANT CHANGE UP (ref 27–34)
MCHC RBC-ENTMCNC: 33.8 GM/DL — SIGNIFICANT CHANGE UP (ref 32–36)
MCV RBC AUTO: 94.4 FL — SIGNIFICANT CHANGE UP (ref 80–100)
NRBC # BLD: 0 /100 WBCS — SIGNIFICANT CHANGE UP (ref 0–0)
PHOSPHATE SERPL-MCNC: 3.9 MG/DL — SIGNIFICANT CHANGE UP (ref 2.5–4.5)
PLATELET # BLD AUTO: 286 K/UL — SIGNIFICANT CHANGE UP (ref 150–400)
POTASSIUM SERPL-MCNC: 3.5 MMOL/L — SIGNIFICANT CHANGE UP (ref 3.5–5.3)
POTASSIUM SERPL-SCNC: 3.5 MMOL/L — SIGNIFICANT CHANGE UP (ref 3.5–5.3)
RBC # BLD: 3.23 M/UL — LOW (ref 3.8–5.2)
RBC # FLD: 13.4 % — SIGNIFICANT CHANGE UP (ref 10.3–14.5)
SODIUM SERPL-SCNC: 139 MMOL/L — SIGNIFICANT CHANGE UP (ref 135–145)
WBC # BLD: 8.89 K/UL — SIGNIFICANT CHANGE UP (ref 3.8–10.5)
WBC # FLD AUTO: 8.89 K/UL — SIGNIFICANT CHANGE UP (ref 3.8–10.5)

## 2022-05-15 RX ORDER — LACTOBACILLUS ACIDOPHILUS 100MM CELL
1 CAPSULE ORAL DAILY
Refills: 0 | Status: DISCONTINUED | OUTPATIENT
Start: 2022-05-15 | End: 2022-05-16

## 2022-05-15 RX ORDER — NYSTATIN/TRIAMCINOLONE ACET
1 OINTMENT (GRAM) TOPICAL
Refills: 0 | Status: DISCONTINUED | OUTPATIENT
Start: 2022-05-15 | End: 2022-05-16

## 2022-05-15 RX ORDER — LOPERAMIDE HCL 2 MG
2 TABLET ORAL ONCE
Refills: 0 | Status: COMPLETED | OUTPATIENT
Start: 2022-05-15 | End: 2022-05-15

## 2022-05-15 RX ORDER — CIPROFLOXACIN LACTATE 400MG/40ML
500 VIAL (ML) INTRAVENOUS EVERY 12 HOURS
Refills: 0 | Status: DISCONTINUED | OUTPATIENT
Start: 2022-05-15 | End: 2022-05-16

## 2022-05-15 RX ADMIN — Medication 975 MILLIGRAM(S): at 11:49

## 2022-05-15 RX ADMIN — Medication 1 TABLET(S): at 18:12

## 2022-05-15 RX ADMIN — Medication 975 MILLIGRAM(S): at 18:12

## 2022-05-15 RX ADMIN — SODIUM CHLORIDE 3 MILLILITER(S): 9 INJECTION INTRAMUSCULAR; INTRAVENOUS; SUBCUTANEOUS at 06:19

## 2022-05-15 RX ADMIN — Medication 975 MILLIGRAM(S): at 06:10

## 2022-05-15 RX ADMIN — Medication 600 MILLIGRAM(S): at 15:56

## 2022-05-15 RX ADMIN — Medication 600 MILLIGRAM(S): at 03:22

## 2022-05-15 RX ADMIN — HEPARIN SODIUM 10000 UNIT(S): 5000 INJECTION INTRAVENOUS; SUBCUTANEOUS at 18:13

## 2022-05-15 RX ADMIN — CEFEPIME 100 MILLIGRAM(S): 1 INJECTION, POWDER, FOR SOLUTION INTRAMUSCULAR; INTRAVENOUS at 06:11

## 2022-05-15 RX ADMIN — CEFEPIME 100 MILLIGRAM(S): 1 INJECTION, POWDER, FOR SOLUTION INTRAMUSCULAR; INTRAVENOUS at 13:33

## 2022-05-15 RX ADMIN — Medication 975 MILLIGRAM(S): at 00:00

## 2022-05-15 RX ADMIN — Medication 1 APPLICATION(S): at 18:11

## 2022-05-15 RX ADMIN — Medication 975 MILLIGRAM(S): at 12:24

## 2022-05-15 RX ADMIN — Medication 600 MILLIGRAM(S): at 22:39

## 2022-05-15 RX ADMIN — Medication 2 MILLIGRAM(S): at 18:17

## 2022-05-15 RX ADMIN — Medication 600 MILLIGRAM(S): at 04:00

## 2022-05-15 RX ADMIN — Medication 600 MILLIGRAM(S): at 22:09

## 2022-05-15 RX ADMIN — Medication 2 MILLIGRAM(S): at 23:55

## 2022-05-15 RX ADMIN — Medication 975 MILLIGRAM(S): at 23:55

## 2022-05-15 RX ADMIN — HEPARIN SODIUM 10000 UNIT(S): 5000 INJECTION INTRAVENOUS; SUBCUTANEOUS at 06:17

## 2022-05-15 RX ADMIN — Medication 500 MILLIGRAM(S): at 18:00

## 2022-05-15 NOTE — PROGRESS NOTE ADULT - ASSESSMENT
41y/o F now POD6 s/p pMTCS c/b postpartum endometritis, GNR bacteremia and now with subsequent development of likely SBO s/p NGT.  Patient bowel function improving however continues to have febrile episodes.    #SBO    - CLD, LR@175, advance diet as tolerated   - AM BMP Mg Phos    - Replete electrolytes PRN    - s/p NGT removed 5/14   - Appreciate gen surg and ENT input     #GNR Bacteremia    - AM CBC+Diff pending   - Continue Cefepime (5/12-)   - BCx (5/11) with GNR, Enterobacter/Klebsiella.    - Repeat BCX (5/12) prelim NGTD  - New febrile episode 5/14, BCx repeated    - Appreciate ID consult and recs for discharge home with PO Cipro 500mg BID (no breastfeeding on Cipro until 2d after last dose)   -  RVP (5/14) negative  -  CXR reading pending    #Postop Care    - HSQ, Venodynes for DVT ppx    - ADAT    - IV Toradol, Tylenol PRN for pain    - Encourage OOB   - VS monitoring per routine     MGreenman PGY-3    41y/o F now POD6 s/p pMTCS c/b postpartum endometritis, GNR bacteremia and now with subsequent development of likely SBO s/p NGT.  Patient bowel function improving however continues to have febrile episodes.    #SBO    - CLD, LR@175, advance diet as tolerated   - AM BMP Mg Phos    - Replete electrolytes PRN    - s/p NGT removed 5/14   - Appreciate gen surg and ENT input   - C diff PCR pending    #GNR Bacteremia    - AM CBC+Diff pending   - Continue Cefepime (5/12-)   - BCx (5/11) with GNR, Enterobacter/Klebsiella.    - Repeat BCX (5/12) prelim NGTD  - New febrile episode 5/14, BCx repeated    - Appreciate ID consult and recs for discharge home with PO Cipro 500mg BID (no breastfeeding on Cipro until 2d after last dose)   -  RVP (5/14) negative  -  CXR reading pending    #Postop Care    - HSQ, Venodynes for DVT ppx    - ADAT    - IV Toradol, Tylenol PRN for pain    - Encourage OOB   - VS monitoring per routine     MGreenman PGY-3

## 2022-05-15 NOTE — PROGRESS NOTE ADULT - SUBJECTIVE AND OBJECTIVE BOX
OBGYN Progress Note     Intervel events: NGT removed after successful clamp trial, febrile episode to 38.3 @ 5pm 5/14    Pt seen and examined at bedside. Denies nausea or emesis overnight. Passing flatus and having bowel movements.  Denies fever, chills, chest pain, SOB, nausea, vomiting, lightheadedness, dizziness.      Vital Signs Last 24 Hrs  T(C): 36.9 (15 May 2022 00:58), Max: 38.3 (14 May 2022 16:56)  T(F): 98.4 (15 May 2022 00:58), Max: 101 (14 May 2022 16:56)  HR: 99 (15 May 2022 00:58) (88 - 101)  BP: 110/75 (15 May 2022 00:58) (110/75 - 137/82)  BP(mean): --  RR: 18 (15 May 2022 00:58) (17 - 18)  SpO2: 95% (15 May 2022 00:58) (95% - 100%)    Physical Exam:  Constitutional: NAD, A+O x3  CV: RRR  Lungs: Clear to auscultation bilaterally  Abdomen: Soft, nondistended, no guarding or rebound tenderness. Normal bowel sounds  Incision: Midline vertical incision c/d/i   : No bleeding on pad  Extremities: No lower extremity edema or calf tenderness bilaterally; Venodynes in place    MEDICATIONS  (STANDING):  cefepime   IVPB 2000 milliGRAM(s) IV Intermittent every 8 hours  diphtheria/tetanus/pertussis (acellular) Vaccine (ADAcel) 0.5 milliLiter(s) IntraMuscular once  famotidine Injectable 20 milliGRAM(s) IV Push once  heparin   Injectable 31186 Unit(s) SubCutaneous every 12 hours  ketorolac   Injectable 30 milliGRAM(s) IV Push every 6 hours  lactated ringers. 1000 milliLiter(s) (175 mL/Hr) IV Continuous <Continuous>    MEDICATIONS  (PRN):  lanolin Ointment 1 Application(s) Topical every 6 hours PRN Sore Nipples  LORazepam   Injectable 1 milliGRAM(s) IV Push every 6 hours PRN Anxiety  morphine  - Injectable 2 milliGRAM(s) IV Push every 4 hours PRN Moderate Pain (4 - 6)  ondansetron Injectable 4 milliGRAM(s) IV Push every 8 hours PRN Nausea and/or Vomiting      LABS:             9.8    7.24  )-----------( 247      ( 05-14 @ 19:26 )             28.8     05-14 @ 07:04    135  |  104  |  22  ----------------------------<  62  3.8   |  18  |  0.49    Ca    8.7      05-14 @ 07:04  Phos  4.1     05-14 @ 07:04  Mg     2.0     05-14 @ 07:04    TPro  5.1  /  Alb  2.0  /  TBili  0.3  /  DBili  x   /  AST  13  /  ALT  8   /  AlkPhos  122  05-14 @ 07:04    PT/INR - ( 05-12 @ 07:08 )   PT: 15.2 sec;   INR: 1.31 ratio    PTT - ( 05-12 @ 07:08 )  PTT:29.8 sec     OBGYN Progress Note     Intervel events: NGT removed after successful clamp trial, febrile episode to 38.3 @ 5pm 5/14. Patient reports multiple episode of diarrhea overnight.    Pt seen and examined at bedside. Denies nausea or emesis overnight. Passing flatus and having bowel movements, diarrhea x5.  Denies fever, chills, chest pain, SOB, nausea, vomiting, lightheadedness, dizziness.      Vital Signs Last 24 Hrs  T(C): 36.9 (15 May 2022 00:58), Max: 38.3 (14 May 2022 16:56)  T(F): 98.4 (15 May 2022 00:58), Max: 101 (14 May 2022 16:56)  HR: 99 (15 May 2022 00:58) (88 - 101)  BP: 110/75 (15 May 2022 00:58) (110/75 - 137/82)  BP(mean): --  RR: 18 (15 May 2022 00:58) (17 - 18)  SpO2: 95% (15 May 2022 00:58) (95% - 100%)    Physical Exam:  Constitutional: NAD, A+O x3  CV: RRR  Lungs: Clear to auscultation bilaterally  Abdomen: Soft, nondistended, no guarding or rebound tenderness. Normal bowel sounds  Incision: Midline vertical incision c/d/i   : No bleeding on pad  Extremities: No lower extremity edema or calf tenderness bilaterally; Venodynes in place    MEDICATIONS  (STANDING):  cefepime   IVPB 2000 milliGRAM(s) IV Intermittent every 8 hours  diphtheria/tetanus/pertussis (acellular) Vaccine (ADAcel) 0.5 milliLiter(s) IntraMuscular once  famotidine Injectable 20 milliGRAM(s) IV Push once  heparin   Injectable 76691 Unit(s) SubCutaneous every 12 hours  ketorolac   Injectable 30 milliGRAM(s) IV Push every 6 hours  lactated ringers. 1000 milliLiter(s) (175 mL/Hr) IV Continuous <Continuous>    MEDICATIONS  (PRN):  lanolin Ointment 1 Application(s) Topical every 6 hours PRN Sore Nipples  LORazepam   Injectable 1 milliGRAM(s) IV Push every 6 hours PRN Anxiety  morphine  - Injectable 2 milliGRAM(s) IV Push every 4 hours PRN Moderate Pain (4 - 6)  ondansetron Injectable 4 milliGRAM(s) IV Push every 8 hours PRN Nausea and/or Vomiting      LABS:             9.8    7.24  )-----------( 247      ( 05-14 @ 19:26 )             28.8     05-14 @ 07:04    135  |  104  |  22  ----------------------------<  62  3.8   |  18  |  0.49    Ca    8.7      05-14 @ 07:04  Phos  4.1     05-14 @ 07:04  Mg     2.0     05-14 @ 07:04    TPro  5.1  /  Alb  2.0  /  TBili  0.3  /  DBili  x   /  AST  13  /  ALT  8   /  AlkPhos  122  05-14 @ 07:04    PT/INR - ( 05-12 @ 07:08 )   PT: 15.2 sec;   INR: 1.31 ratio    PTT - ( 05-12 @ 07:08 )  PTT:29.8 sec

## 2022-05-15 NOTE — PROGRESS NOTE ADULT - SUBJECTIVE AND OBJECTIVE BOX
Section/Postpartum    KEY GARCIA is a  42y woman  , who is now post-operative day: 6    PAST MEDICAL & SURGICAL HISTORY:  ANA (iron deficiency anemia)      Low back pain      History of ovarian cyst      History of endometriosis      Obesity  BMI 42      History of cardiac murmur      Benign cyst of skin  removal at age 16 from temple      History of myomectomy  2018      History of appendectomy  2019      History of tubal ligation  Excision [id number]          Subjective:  The patient feeling better but is having bouts of diarrhea,  She is ambulating.   She is tolerating regular diet.  She denies nausea and vomiting.  She is voiding.  Her pain is controlled with tylenol and motrin.  She reports normal postpartum bleeding.  She is formula feeding.    Physical exam:    Vital Signs Last 24 Hrs  T(C): 36.8 (15 May 2022 13:14), Max: 38.3 (14 May 2022 16:56)  T(F): 98.2 (15 May 2022 13:14), Max: 101 (14 May 2022 16:56)  HR: 91 (15 May 2022 13:14) (91 - 101)  BP: 115/75 (15 May 2022 13:14) (110/75 - 137/82)  BP(mean): --  RR: 18 (15 May 2022 13:14) (17 - 18)  SpO2: 98% (15 May 2022 13:14) (95% - 98%)    General: alert and oriented in no acute distress.  Breast: Soft, nontender, not engorged.  Abdomen: Soft, minimally tender, not distended , Macular erythematous rash noted in the area affected by abdominal binder and in the groin area  Uterine fundus is firm and at below the umbilicus, BS (+), Flatus (+), Bowel Movement (+)  Incision: Clean, dry, and intact, some of the staples were removed  Pelvic: Normal lochia noted  Ext: No calf tenderness; left arm with redness and induration at the IV site    LABS:                        10.3   8.89  )-----------( 286      ( 15 May 2022 11:49 )             30.5       Rubella status:  Immune    Blood Type:  A+                  Allergies    amoxicillin (Other)    Intolerances        MEDICATIONS  (STANDING):  acetaminophen     Tablet .. 975 milliGRAM(s) Oral every 6 hours  ciprofloxacin     Tablet 500 milliGRAM(s) Oral every 12 hours  diphtheria/tetanus/pertussis (acellular) Vaccine (ADAcel) 0.5 milliLiter(s) IntraMuscular once  famotidine Injectable 20 milliGRAM(s) IV Push once  heparin   Injectable 29962 Unit(s) SubCutaneous every 12 hours  ibuprofen  Tablet. 600 milliGRAM(s) Oral every 6 hours  lactobacillus acidophilus 1 Tablet(s) Oral daily  loperamide 2 milliGRAM(s) Oral once  nystatin/triamcinolone Cream 1 Application(s) Topical two times a day  sodium chloride 0.9% lock flush 3 milliLiter(s) IV Push every 8 hours    MEDICATIONS  (PRN):  lanolin Ointment 1 Application(s) Topical every 6 hours PRN Sore Nipples  LORazepam   Injectable 1 milliGRAM(s) IV Push every 6 hours PRN Anxiety  morphine  - Injectable 2 milliGRAM(s) IV Push every 4 hours PRN Moderate Pain (4 - 6)  ondansetron Injectable 4 milliGRAM(s) IV Push every 8 hours PRN Nausea and/or Vomiting        Assessment and Plan:    POD #6  s/p  PCS       Much improved  Encourage ambulation, may shower, routine postop care.  will D/C IV and start on oral cipro  500 mg BID  If she remains afebrile will d/c in the am.

## 2022-05-16 VITALS
HEART RATE: 90 BPM | RESPIRATION RATE: 18 BRPM | DIASTOLIC BLOOD PRESSURE: 82 MMHG | TEMPERATURE: 98 F | OXYGEN SATURATION: 98 % | SYSTOLIC BLOOD PRESSURE: 121 MMHG

## 2022-05-16 DIAGNOSIS — B37.0 CANDIDAL STOMATITIS: ICD-10-CM

## 2022-05-16 PROCEDURE — 71046 X-RAY EXAM CHEST 2 VIEWS: CPT

## 2022-05-16 PROCEDURE — 87186 SC STD MICRODIL/AGAR DIL: CPT

## 2022-05-16 PROCEDURE — 87324 CLOSTRIDIUM AG IA: CPT

## 2022-05-16 PROCEDURE — 80048 BASIC METABOLIC PNL TOTAL CA: CPT

## 2022-05-16 PROCEDURE — 84100 ASSAY OF PHOSPHORUS: CPT

## 2022-05-16 PROCEDURE — 85025 COMPLETE CBC W/AUTO DIFF WBC: CPT

## 2022-05-16 PROCEDURE — 93005 ELECTROCARDIOGRAM TRACING: CPT

## 2022-05-16 PROCEDURE — 83605 ASSAY OF LACTIC ACID: CPT

## 2022-05-16 PROCEDURE — 87040 BLOOD CULTURE FOR BACTERIA: CPT

## 2022-05-16 PROCEDURE — 87086 URINE CULTURE/COLONY COUNT: CPT

## 2022-05-16 PROCEDURE — 74177 CT ABD & PELVIS W/CONTRAST: CPT

## 2022-05-16 PROCEDURE — 82247 BILIRUBIN TOTAL: CPT

## 2022-05-16 PROCEDURE — 0225U NFCT DS DNA&RNA 21 SARSCOV2: CPT

## 2022-05-16 PROCEDURE — 86900 BLOOD TYPING SEROLOGIC ABO: CPT

## 2022-05-16 PROCEDURE — 85027 COMPLETE CBC AUTOMATED: CPT

## 2022-05-16 PROCEDURE — 83735 ASSAY OF MAGNESIUM: CPT

## 2022-05-16 PROCEDURE — 59025 FETAL NON-STRESS TEST: CPT

## 2022-05-16 PROCEDURE — 87449 NOS EACH ORGANISM AG IA: CPT

## 2022-05-16 PROCEDURE — 71260 CT THORAX DX C+: CPT

## 2022-05-16 PROCEDURE — 85610 PROTHROMBIN TIME: CPT

## 2022-05-16 PROCEDURE — 71045 X-RAY EXAM CHEST 1 VIEW: CPT

## 2022-05-16 PROCEDURE — 81003 URINALYSIS AUTO W/O SCOPE: CPT

## 2022-05-16 PROCEDURE — 74018 RADEX ABDOMEN 1 VIEW: CPT

## 2022-05-16 PROCEDURE — 80053 COMPREHEN METABOLIC PANEL: CPT

## 2022-05-16 PROCEDURE — 59050 FETAL MONITOR W/REPORT: CPT

## 2022-05-16 PROCEDURE — 36415 COLL VENOUS BLD VENIPUNCTURE: CPT

## 2022-05-16 PROCEDURE — 82248 BILIRUBIN DIRECT: CPT

## 2022-05-16 PROCEDURE — 86901 BLOOD TYPING SEROLOGIC RH(D): CPT

## 2022-05-16 PROCEDURE — 87077 CULTURE AEROBIC IDENTIFY: CPT

## 2022-05-16 PROCEDURE — 87150 DNA/RNA AMPLIFIED PROBE: CPT

## 2022-05-16 PROCEDURE — 99232 SBSQ HOSP IP/OBS MODERATE 35: CPT

## 2022-05-16 PROCEDURE — 85730 THROMBOPLASTIN TIME PARTIAL: CPT

## 2022-05-16 PROCEDURE — 86850 RBC ANTIBODY SCREEN: CPT

## 2022-05-16 PROCEDURE — 86769 SARS-COV-2 COVID-19 ANTIBODY: CPT

## 2022-05-16 PROCEDURE — 86780 TREPONEMA PALLIDUM: CPT

## 2022-05-16 RX ORDER — MULTIVIT WITH MIN/MFOLATE/K2 340-15/3 G
1 POWDER (GRAM) ORAL
Qty: 0 | Refills: 0 | DISCHARGE

## 2022-05-16 RX ORDER — CALCIUM POLYCARBOPHIL 625 MG/1
2 TABLET, FILM COATED ORAL
Qty: 0 | Refills: 0 | DISCHARGE

## 2022-05-16 RX ORDER — DOCUSATE SODIUM 100 MG
1 CAPSULE ORAL
Qty: 0 | Refills: 0 | DISCHARGE

## 2022-05-16 RX ORDER — IBUPROFEN 200 MG
1 TABLET ORAL
Qty: 120 | Refills: 0
Start: 2022-05-16 | End: 2022-06-14

## 2022-05-16 RX ORDER — LACTOBACILLUS ACIDOPHILUS 100MM CELL
1 CAPSULE ORAL
Qty: 0 | Refills: 0 | DISCHARGE
Start: 2022-05-16

## 2022-05-16 RX ORDER — NYSTATIN/TRIAMCINOLONE ACET
1 OINTMENT (GRAM) TOPICAL
Qty: 1 | Refills: 0
Start: 2022-05-16 | End: 2022-05-22

## 2022-05-16 RX ADMIN — Medication 975 MILLIGRAM(S): at 00:25

## 2022-05-16 RX ADMIN — Medication 500 MILLIGRAM(S): at 05:56

## 2022-05-16 RX ADMIN — Medication 975 MILLIGRAM(S): at 05:55

## 2022-05-16 RX ADMIN — Medication 1 APPLICATION(S): at 05:56

## 2022-05-16 RX ADMIN — Medication 600 MILLIGRAM(S): at 04:17

## 2022-05-16 RX ADMIN — Medication 975 MILLIGRAM(S): at 12:16

## 2022-05-16 RX ADMIN — Medication 600 MILLIGRAM(S): at 08:37

## 2022-05-16 RX ADMIN — HEPARIN SODIUM 10000 UNIT(S): 5000 INJECTION INTRAVENOUS; SUBCUTANEOUS at 05:56

## 2022-05-16 RX ADMIN — Medication 975 MILLIGRAM(S): at 06:25

## 2022-05-16 RX ADMIN — Medication 600 MILLIGRAM(S): at 03:47

## 2022-05-16 NOTE — PROGRESS NOTE ADULT - PROVIDER SPECIALTY LIST ADULT
OB
Trauma Surgery
Anesthesia
Anesthesia
Infectious Disease
OB
Infectious Disease
OB

## 2022-05-16 NOTE — PROGRESS NOTE ADULT - REASON FOR ADMISSION
PCS/ h/o Myomectomy
elective PCS
PCS for previous myomectomy
PCS/h/o myomectomy
scheduled c/s because of previous Myomectomy
PCS
PCS/ h/o myomectomy
PCS/h/o myomectomy

## 2022-05-16 NOTE — PROGRESS NOTE ADULT - ASSESSMENT
41y/o F now POD#7 s/p pMTCS c/b postpartum endometritis, GNR bacteremia and subsequent development of likely SBO s/p NGT. NGT removed 5/15, pt tolerating regular diet w/o n/v.  Patient has remained afebrile since 5/14 although continues to complain of loose BM.     #s/p SBO    - tolerating reg diet w/o n/v, LR@175, advance diet as tolerated   - loose BM, C diff PCR neg    - Electrolytes wnl, replete PRN    - s/p NGT removed 5/14   - Appreciate gen surg and ENT input     #GNR Bacteremia    - AM CBC+Diff wnl   - s/p Cefepime (5/12-5/15), Cipro (5-15 - )   - BCx (5/11) with GNR, Enterobacter/Klebsiella.    - Repeat BCX (5/12) prelim NGTD  - Following febrile episode 5/14, BCx repeated - (p) NGTD    - Appreciate ID consult and recs for discharge home with PO Cipro 500mg BID (no breastfeeding on Cipro until 2d after last dose)   -  RVP (5/14) negative  -  CXR: less pulmonary congestion with questionable lower   right infiltrate and there is no evidence of pneumothorax nor pleural   effusion. per radiology repeat as clinically indicated - no current indication***      #Postop Care    - HSQ, Venodynes for DVT ppx    - Reg diet    - PO Tylenol/Motrin PRN for pain    - Encourage OOB   - VS monitoring per routine       Vivian Mcclain MD  OBGYN PGY3  43y/o F now POD#7 s/p pMTCS c/b postpartum endometritis, GNR bacteremia and subsequent development of likely SBO s/p NGT. NGT removed 5/15, pt tolerating regular diet w/o n/v.  Patient has remained afebrile since 5/14 although continues to complain of loose BM.     #s/p SBO    - tolerating reg diet w/o n/v, LR@175, advance diet as tolerated   - loose BM, C diff PCR neg    - Electrolytes wnl, replete PRN    - s/p NGT removed 5/14   - Appreciate gen surg and ENT input     #GNR Bacteremia    - AM CBC+Diff wnl   - s/p Cefepime (5/12-5/15), Cipro (5-15 - )   - BCx (5/11) with GNR, Enterobacter/Klebsiella.    - Repeat BCX (5/12) prelim NGTD  - Following febrile episode 5/14, BCx repeated - (p) NGTD    - Appreciate ID consult and recs for discharge home with PO Cipro 500mg BID (no breastfeeding on Cipro until 2d after last dose)   -  RVP (5/14) negative  -  CXR: less pulmonary congestion with questionable lower   right infiltrate and there is no evidence of pneumothorax nor pleural   effusion. per radiology repeat as clinically indicated - no current indication***      #Postop Care    - HSQ, Venodynes for DVT ppx    - Reg diet    - PO Tylenol/Motrin PRN for pain    - Encourage OOB   - VS monitoring per routine   - consider staple removal prior to d/c      Vivian Mcclain MD  OBGYN PGY3  41y/o F now POD#7 s/p pMTCS c/b postpartum endometritis, GNR bacteremia and subsequent development of likely SBO s/p NGT. NGT removed 5/15, pt tolerating regular diet w/o n/v.  Patient has remained afebrile since 5/14 although continues to complain of loose BM.     #s/p SBO    - tolerating reg diet w/o n/v, LR@175, advance diet as tolerated   - loose BM, C diff PCR neg    - Electrolytes wnl, replete PRN    - s/p NGT removed 5/14      #GNR Bacteremia    - AM CBC+Diff wnl   - s/p Cefepime (5/12-5/15), Cipro (5-15 - )   - BCx (5/11) with GNR, Enterobacter/Klebsiella.    - Repeat BCX (5/12) prelim NGTD  - Following febrile episode 5/14, BCx repeated - (p) NGTD    - Appreciate ID consult and recs for discharge home with PO Cipro 500mg BID (no breastfeeding on Cipro until 2d after last dose)   -  RVP (5/14) negative  -  CXR: less pulmonary congestion with questionable lower   right infiltrate and there is no evidence of pneumothorax nor pleural   effusion. per radiology repeat as clinically indicated - no current indication      #Postop Care    - HSQ, Venodynes for DVT ppx    - Reg diet    - PO Tylenol/Motrin PRN for pain    - Encourage OOB   - VS monitoring per routine   - consider staple removal prior to d/c    #Candidiasis   - Mons, labia, and inner thighs moist & with diffuse erythematous rash likely fungal   - Nystatin cream BID  - maintain chucks/pad over area to maintain dry        Viviantheron Mcclain MD  OBGYN PGY3

## 2022-05-16 NOTE — PROGRESS NOTE ADULT - PROBLEM SELECTOR PLAN 3
She had fever at 1700
appreciate ID consult  antibiotics changed to cefitime  Afebrile and WBC is normal today  Incentive spirometry encouraged
IV antibiotics to be continued  recent Blood cultures are negative
blood cultures + gram negative rods  ID consult  continue with antibiotics
resolved   continue with cipro fir 5 days
resolved and repeat blood cultures are negative for growth.  continue IV antibiotics with transition to oral Cipro as per ID

## 2022-05-16 NOTE — PROGRESS NOTE ADULT - PROBLEM SELECTOR PROBLEM 1
Maternal care due to uterine scar from previous surgery
Postpartum examination following  delivery
Maternal care due to uterine scar from previous surgery

## 2022-05-16 NOTE — PROGRESS NOTE ADULT - ASSESSMENT
section .   Post partum endometritis with Klebsiella aerogenes bacteremia .   CT with post op changes and surgical site looks good.   Recovered from sepsis and ileus, doing well.   Tolerated Cefepime despite diffuse pruritus from Amoxicillin.     Suggest  -  -f/u repeat blood cultures   -if clinically better next week could use PO Cipro 500mg BID for discharge although she would have to hold breastfeeding until two days after her last dose     Discussed with OB    James Crockett MD   Infectious Disease   Available on TEAMS. After 5PM and on weekends please page fellow on call or call [phone]  section .   Post partum endometritis with Klebsiella aerogenes bacteremia .   CT with post op changes and surgical site looks good.   Doing well. Recovered from sepsis and ileus.   Tolerated Cefepime despite diffuse pruritus from Amoxicillin.     Suggest  -discharge on Cipro 500mg PO BID until   -hold breast feeding until 2 days after the last dose   -f/u final blood cultures     Discussed with OB    James Crockett MD   Infectious Disease   Available on TEAMS. After 5PM and on weekends please page fellow on call or call [phone]

## 2022-05-16 NOTE — PROGRESS NOTE ADULT - PROBLEM SELECTOR PLAN 5
NGT with decreasing out put.   will keep in over night and d/c in the am
to decompress the bowels
resolved with conservative management.  NPO, NGT and GI rest.

## 2022-05-16 NOTE — PROGRESS NOTE ADULT - PROBLEM SELECTOR PROBLEM 3
Postoperative fever

## 2022-05-16 NOTE — PROGRESS NOTE ADULT - SUBJECTIVE AND OBJECTIVE BOX
Follow Up: Bacteremia     Interval History/ROS: Afebrile. Feels much much better. Ready to go home. Had diarrhea over the weekend, C diff negative, better today after immodium.     Allergies  amoxicillin (Other)        ANTIMICROBIALS:  ciprofloxacin     Tablet 500 every 12 hours      OTHER MEDS:  acetaminophen     Tablet .. 975 milliGRAM(s) Oral every 6 hours  diphtheria/tetanus/pertussis (acellular) Vaccine (ADAcel) 0.5 milliLiter(s) IntraMuscular once  famotidine Injectable 20 milliGRAM(s) IV Push once  heparin   Injectable 52113 Unit(s) SubCutaneous every 12 hours  ibuprofen  Tablet. 600 milliGRAM(s) Oral every 6 hours  lactobacillus acidophilus 1 Tablet(s) Oral daily  lanolin Ointment 1 Application(s) Topical every 6 hours PRN  LORazepam   Injectable 1 milliGRAM(s) IV Push every 6 hours PRN  morphine  - Injectable 2 milliGRAM(s) IV Push every 4 hours PRN  nystatin/triamcinolone Cream 1 Application(s) Topical two times a day  ondansetron Injectable 4 milliGRAM(s) IV Push every 8 hours PRN  sodium chloride 0.9% lock flush 3 milliLiter(s) IV Push every 8 hours      Vital Signs Last 24 Hrs  T(C): 36.8 (16 May 2022 10:10), Max: 37.2 (15 May 2022 20:16)  T(F): 98.2 (16 May 2022 10:10), Max: 98.9 (15 May 2022 20:16)  HR: 90 (16 May 2022 10:10) (80 - 99)  BP: 121/82 (16 May 2022 10:10) (112/75 - 125/81)  BP(mean): --  RR: 18 (16 May 2022 10:10) (18 - 18)  SpO2: 98% (16 May 2022 10:10) (96% - 99%)    Physical Exam:  General: non toxic  ENT: NG tube removed   Cardio: regular rate   Respiratory: nonlabored   Skin: no rash                          10.3   8.89  )-----------( 286      ( 15 May 2022 11:49 )             30.5       05-15    139  |  106  |  12  ----------------------------<  95  3.5   |  21<L>  |  0.46<L>    Ca    8.5      15 May 2022 11:49  Phos  3.9     05-15  Mg     1.8     05-15            MICROBIOLOGY:  Culture - Blood (collected 05-15-22 @ 11:35)  Source: .Blood Blood-Peripheral  Preliminary Report (22 @ 15:01):    No growth to date.    Culture - Blood (collected 22 @ 19:26)  Source: .Blood Blood-Peripheral  Preliminary Report (05-15-22 @ 23:01):    No growth to date.    Culture - Blood (collected 22 @ 12:38)  Source: .Blood Blood-Peripheral  Preliminary Report (22 @ 18:01):    No growth to date.    Culture - Blood (collected 22 @ 12:38)  Source: .Blood Blood-Peripheral  Preliminary Report (22 @ 18:01):    No growth to date.    Culture - Urine (collected 22 @ 00:54)  Source: Catheterized Catheterized  Final Report (22 @ 23:51):    No growth    Culture - Blood (collected 22 @ 00:26)  Source: .Blood Blood-Peripheral  Gram Stain (22 @ 09:51):    Growth in anaerobic bottle: Gram Negative Rods    Growth in aerobic bottle: Gram Negative Rods  Final Report (22 @ 10:32):    Growth in aerobic and anaerobic bottles: Klebsiella aerogenes    ***Blood Panel PCR results on this specimen are available    approximately 3 hours after the Gram stain result.***    Gram stain, PCR, and/or culture results may not always    correspond due to difference in methodologies.    ************************************************************    This PCR assay was performed by multiplex PCR. This    Assay tests for 66 bacterial and resistance gene targets.    Please refer to the [facility] test directory    at [email or web address] for details.  Organism: Blood Culture PCR  Enterobacter aerogenes (22 @ 10:32)  Organism: Enterobacter aerogenes (22 @ 10:32)      -  Amikacin: S <=16      -  Ampicillin: R 16 These ampicillin results predict results for amoxicillin      -  Ampicillin/Sulbactam: R <=4/2 Enterobacter, Klebsiella aerogenes, Citrobacter, and Serratia may develop resistance during prolonged therapy (3-4 days)      -  Aztreonam: S <=4      -  Cefazolin: R >16 Enterobacter, Klebsiella aerogenes, Citrobacter, and Serratia may develop resistance during prolonged therapy (3-4 days)      -  Cefepime: S <=2      -  Cefoxitin: R >16      -  Ceftriaxone: S <=1 Enterobacter, Klebsiella aerogenes, Citrobacter, and Serratia may develop resistance during prolonged therapy      -  Ciprofloxacin: S <=0.25      -  Ertapenem: S <=0.5      -  Gentamicin: S <=2      -  Imipenem: S <=1      -  Levofloxacin: S <=0.5      -  Meropenem: S <=1      -  Piperacillin/Tazobactam: S <=8      -  Tobramycin: S <=2      -  Trimethoprim/Sulfamethoxazole: S <=0.5/9.5      Method Type: BABAK  Organism: Blood Culture PCR (22 @ 10:32)      -  Enterobacter (non-cloacae complex): Detec      Method Type: PCR    Culture - Blood (collected 22 @ 00:26)  Source: .Blood Blood-Peripheral  Gram Stain (22 @ 17:33):    Growth in anaerobic bottle: Gram Negative Rods  Final Report (22 @ 14:38):    Growth in anaerobic bottle: Klebsiella aerogenes    See previous culture [id number]      Rapid RVP Result: NotDetec ( @ 18:15)    Clostridium difficile GDH Toxins A&amp;B, EIA:   Negative (05-15-22 @ 11:23)  Clostridium difficile GDH Interpretation: Negative for toxigenic C. Difficile.  This specimen is negative for C.  Difficile glutamate dehydrogenase (GDH) antigen and negative for C.  Difficile Toxins A & B, by EIA.  GDH is a highly sensitive screening  marker for C. Difficile that is produced in large amounts by all C.  Difficile strains, both toxigenic and nontoxigenic.  This assay has not  been validated as a test of cure.  Repeat testing during the same episode  of diarrhea is of limited value and is discouraged.  The results of this  assay should always be interpreted in conjunction with pateint's clinical  history. (05-15-22 @ 11:23)    RADIOLOGY:  Images below reviewed personally  Xray Chest 2 Views PA/Lat (22 @ 23:09)   Decreased congestion. Questionable right infiltrate. Follow-up study is   recommended as clinically warranted.    CT Abdomen and Pelvis w/ Oral Cont and w/ IV Cont (22 @ 01:03)   Motion and streak artifact limited study.  Status post  with mildly loculated fluid admixed with foci of   air above the decompressed urinary bladder and anterior to the uterus   contiguous with the  incision.  Several mildly dilated loops of small bowel in the left hemiabdomen   without discrete transition point, favoring ileus in the setting of   recent procedure versus partial small bowel obstruction.  Bibasilar subsegmental atelectasis.

## 2022-05-16 NOTE — PROGRESS NOTE ADULT - PROBLEM SELECTOR PLAN 2
incision is dry and clean   some of the staples have been removed. The remaining will be removed on her office visit.
RUQ pain with hyperemesis  IV fluids  antibiotics Nikole and gentamycin  abd. x-ray
incision is dry and clean  lochia is light
routine care
routine care  removal of rest of staples on out patient visit
uterus is well contracted  lochia is minimal

## 2022-05-16 NOTE — PROGRESS NOTE ADULT - PROBLEM SELECTOR PLAN 4
resolving, mild epigastric discomfort. abdomin is soft.  encourage ambulation and incentive spirometry.
surgical consult appreciated  continue with NGT  encourage ambulation  decrease narcotics  IV hydration  replace fluids  monitor K  fleet enema to be considered if she does not continue to pass gas
resolved- Surgical input is appreciated.  NGT was removed  she is tolerating clear fluids; will advance diet as tolerated  encouraged to ambulate  to use breast pump
Surgical consult this am  NPO, abd x-ray, CT of the abd- done  NG tube ( was attempted x 3 but unsuccessful-blockage noted in left nostril)  cbc, cmp and t+s ordered.
Mycolog II cream BID to affected areas.

## 2022-05-16 NOTE — PROGRESS NOTE ADULT - PROBLEM SELECTOR PROBLEM 2
Postpartum examination following  delivery

## 2022-05-16 NOTE — PROGRESS NOTE ADULT - SUBJECTIVE AND OBJECTIVE BOX
Section/Postpartum    KEY GARCIA is a  42y woman  , who is now post-operative day 7.    PAST MEDICAL & SURGICAL HISTORY:  ANA (iron deficiency anemia)      Low back pain      History of ovarian cyst      History of endometriosis      Obesity  BMI 42      History of cardiac murmur      Benign cyst of skin  removal at age 16 from temple      History of myomectomy  2018      History of appendectomy  2019      History of tubal ligation  Excision [id number]          Subjective:  The patient feels well.  She is ambulating without difficulty.  She is tolerating PO.  She is voiding.  She denies nausea and vomiting.  Her pain is controlled.  She reports normal postpartum bleeding  She is formula feeding.    Physical exam:    Vital Signs Last 24 Hrs  T(C): 36.6 (16 May 2022 05:56), Max: 37.2 (15 May 2022 20:16)  T(F): 97.9 (16 May 2022 05:56), Max: 98.9 (15 May 2022 20:16)  HR: 94 (16 May 2022 05:56) (91 - 99)  BP: 115/81 (16 May 2022 05:56) (112/75 - 125/81)  BP(mean): --  RR: 18 (16 May 2022 05:56) (18 - 18)  SpO2: 96% (16 May 2022 05:56) (96% - 99%)    General Apperance: alert and oriented in no acute distress  Breast: Soft, nontender, non engorged  Abdomen: Soft, nontender, not distended,  Uterine fundus is firm and below the umbilicus, BS(+), Flatus(+), Bowel Movement (+)  Incision: Clean, dry, and intact   Pelvic: Normal lochia noted  Ext: No calf tenderness, No edema or cyanosis  Lochia: not excessive    LABS:                        10.3   8.89  )-----------( 286      ( 15 May 2022 11:49 )             30.5       Allergies    amoxicillin (Other)    Intolerances        MEDICATIONS  (STANDING):  acetaminophen     Tablet .. 975 milliGRAM(s) Oral every 6 hours  ciprofloxacin     Tablet 500 milliGRAM(s) Oral every 12 hours  diphtheria/tetanus/pertussis (acellular) Vaccine (ADAcel) 0.5 milliLiter(s) IntraMuscular once  famotidine Injectable 20 milliGRAM(s) IV Push once  heparin   Injectable 54621 Unit(s) SubCutaneous every 12 hours  ibuprofen  Tablet. 600 milliGRAM(s) Oral every 6 hours  lactobacillus acidophilus 1 Tablet(s) Oral daily  nystatin/triamcinolone Cream 1 Application(s) Topical two times a day  sodium chloride 0.9% lock flush 3 milliLiter(s) IV Push every 8 hours    MEDICATIONS  (PRN):  lanolin Ointment 1 Application(s) Topical every 6 hours PRN Sore Nipples  LORazepam   Injectable 1 milliGRAM(s) IV Push every 6 hours PRN Anxiety  morphine  - Injectable 2 milliGRAM(s) IV Push every 4 hours PRN Moderate Pain (4 - 6)  ondansetron Injectable 4 milliGRAM(s) IV Push every 8 hours PRN Nausea and/or Vomiting        Assessment and Plan:    POD # 7  S/P MTPCS         Doing well.  Encouraged to ambulate.  Drink plenty of water and fluids.  Discharge home today.  Paient to take ibuprofen 600mg every 6 hours and/or Tylenol 2 (325mg tablet) tablets every 6 hoursfor pain  Follow up in 1-2 days(s) for incision check and removal of the rest of her matt, KEY GARCIA is to call the office for an appointment.  To use abdominal binder while awake as needed  Verbal discharge instructions d/w patient  - discharge summary to be given to her on discharge for the hospital.  Call for fevers, chills, nausea, vomiting, heavy vaginal bleeding, vaginal discharge, severe pain, symptoms of depression, problems with incision or any other concerning symptoms.  Nothing in vagina and no heavy lifting for  6-8 weeks. No sex!  No driving x 2 weeks, Do not drive if you are taking narcotics.  Patient to continue with prenatal vitamins, probiotics,and cipro BID for five days. She will pump breasts and dump milk for up to 2 days after finishing the Cipro.

## 2022-05-16 NOTE — PROGRESS NOTE ADULT - PROBLEM SELECTOR PROBLEM 4
Small bowel obstruction
Ileus, unspecified
Stomatitis monilial

## 2022-05-16 NOTE — PROGRESS NOTE ADULT - PROBLEM SELECTOR PLAN 1
s/p C/S
PCS
routine postpartum care
C/S 5/9/22  Mid-transverse C/S
s/p mid-transverse C/S
S/P PCS for previous myomectomy and breech presentation  encouraged to ambulate   MOM today  possible [facility]

## 2022-05-17 LAB
CULTURE RESULTS: SIGNIFICANT CHANGE UP
SPECIMEN SOURCE: SIGNIFICANT CHANGE UP

## 2022-05-19 LAB
CULTURE RESULTS: SIGNIFICANT CHANGE UP
SPECIMEN SOURCE: SIGNIFICANT CHANGE UP

## 2022-05-20 LAB
CULTURE RESULTS: SIGNIFICANT CHANGE UP
SPECIMEN SOURCE: SIGNIFICANT CHANGE UP

## 2023-10-07 NOTE — ED ADULT NURSE NOTE - PSYCHOSOCIAL WDL
Thank you for allowing us to care for you at [facility] Emergency Room today. Thank you for your patience.     You have been seen and evaluated. We reviewed the results pertinent to your visit in the emergency department today. Please read the instructions provided. If any prescriptions were sent for you, please fill it from your pharmacy and take them as instructed.     Remember, your care process does not end after your visit today. Please follow-up with your doctor as advised for a follow-up check to ensure you are  improving, to see if you need any further evaluation/testing, or to evaluate for any alternate diagnoses.     Please return to the emergency department if you develop any worsening or other new or concerning symptoms as these could be signs of more serious medical illness.      
Alert and oriented x 3, normal mood and affect, no apparent risk to self or others.

## 2025-01-20 NOTE — PROVIDER CONTACT NOTE (OTHER) - SITUATION
Left message for pt - I need current Rx insurance benefit information in order to submit a prior auth for Dupixent. Awaiting return call.    Abby Liao, PharmD  Specialty Pharmacist  Pulmonology/Gastroenterology    [phone]    
Pt. refusing SCD

## 2025-06-24 NOTE — DISCHARGE NOTE OB - NS AS DC FU INST LIST INST
Universal Protocol/Time Out Physician Office Checklist       Date: 2025     Patient Name: Lottie Neff    : 2006   MRN: [id number]       Procedure (as it appears on the consent form): IUD REMOVAL AND IUD INSERTION    Check if completed:    [] Correct patient (entire first name, entire last name; date of birth confirmed)    [] Correct procedure    [] Completed Informed Consent reviewed and accurate    [] A urine pregnancy test was performed, if required per [facility] Policy.  The provider  was informed of the result prior to beginning the procedure.  The result was    _____________    [] Pertinent / relevant medical record detail reviewed (H&P, progress notes, etc); including  labs and pertinent imaging studies (all properly labeled)    [] Correct Site (i.e., left vs. right) / Correct Level (i.e., thoracic vs. lumbar). Verbal   confirmation of site, procedure, patient with either patient or family if patient cannot  provide confirmation   [] Right [] Left  [] Bilateral [] Site marking does not apply    [] Immediate availability of all necessary equipment    [] Confirm equipment sterilization by the packaging indicator strip and date or high level  disinfection by confirming the date of cleaning on the appropriate log sheet.      ______________________________   _______________________________             Verification by:       Verification by:   Signature of /MD/PA/ROBERT     Signature of Clinical Associate    Comments: ___________________________________________________________________________    ___________________________________________________________________________                no